# Patient Record
Sex: MALE | Race: WHITE | NOT HISPANIC OR LATINO | Employment: UNEMPLOYED | URBAN - METROPOLITAN AREA
[De-identification: names, ages, dates, MRNs, and addresses within clinical notes are randomized per-mention and may not be internally consistent; named-entity substitution may affect disease eponyms.]

---

## 2018-09-13 ENCOUNTER — HOSPITAL ENCOUNTER (EMERGENCY)
Facility: CLINIC | Age: 33
Discharge: HOME | End: 2018-09-14
Attending: EMERGENCY MEDICINE | Admitting: EMERGENCY MEDICINE

## 2018-09-13 DIAGNOSIS — M62.82 NON-TRAUMATIC RHABDOMYOLYSIS: ICD-10-CM

## 2018-09-13 DIAGNOSIS — F15.10 METHAMPHETAMINE ABUSE (H): ICD-10-CM

## 2018-09-13 DIAGNOSIS — F29 PSYCHOSIS, UNSPECIFIED PSYCHOSIS TYPE (H): ICD-10-CM

## 2018-09-13 PROCEDURE — 25000125 ZZHC RX 250

## 2018-09-13 PROCEDURE — 96360 HYDRATION IV INFUSION INIT: CPT

## 2018-09-13 PROCEDURE — 25000128 H RX IP 250 OP 636

## 2018-09-13 PROCEDURE — 96361 HYDRATE IV INFUSION ADD-ON: CPT

## 2018-09-13 PROCEDURE — 99285 EMERGENCY DEPT VISIT HI MDM: CPT | Mod: 25

## 2018-09-13 PROCEDURE — 25000132 ZZH RX MED GY IP 250 OP 250 PS 637: Performed by: EMERGENCY MEDICINE

## 2018-09-13 RX ORDER — OLANZAPINE 10 MG/1
10 TABLET, ORALLY DISINTEGRATING ORAL ONCE
Status: COMPLETED | OUTPATIENT
Start: 2018-09-13 | End: 2018-09-13

## 2018-09-13 RX ORDER — OLANZAPINE 10 MG/2ML
INJECTION, POWDER, FOR SOLUTION INTRAMUSCULAR
Status: COMPLETED
Start: 2018-09-13 | End: 2018-09-13

## 2018-09-13 RX ORDER — WATER 10 ML/10ML
INJECTION INTRAMUSCULAR; INTRAVENOUS; SUBCUTANEOUS
Status: COMPLETED
Start: 2018-09-13 | End: 2018-09-13

## 2018-09-13 RX ADMIN — OLANZAPINE 10 MG: 10 TABLET, ORALLY DISINTEGRATING ORAL at 15:04

## 2018-09-13 RX ADMIN — WATER: 1 INJECTION INTRAMUSCULAR; INTRAVENOUS; SUBCUTANEOUS at 16:28

## 2018-09-13 RX ADMIN — OLANZAPINE: 10 INJECTION, POWDER, LYOPHILIZED, FOR SOLUTION INTRAMUSCULAR at 16:28

## 2018-09-13 NOTE — ED NOTES
PT will need to be moved to [facility] , he is moving around in the room a lot and has been looking into cabinets and touching the computer desk.

## 2018-09-13 NOTE — ED AVS SNAPSHOT
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      Follow-up Information     Schedule an appointment as soon as possible for a visit with No Ref-Primary, Physician.        Discharge Instructions       Stop using meth, you must push fluids over the next 48 hours to preserve your kidney function.  Recheck in the clinic early next week with your primary care doctor.  Recommend you talk to him about getting help with your meth addiction.        Discharge References/Attachments     GETTING HELP, ADDICTION (ENGLISH)    METHAMPHETAMINE ABUSE AND ADDICTION, UNDERSTANDING (ENGLISH)    RHABDOMYOLYSIS (ENGLISH)      24 Hour Appointment Hotline       To make an appointment at any [facility], call [phone]). If you don't have a family doctor or clinic, we will help you find one. [facility] are conveniently located to serve the needs of you and your family.             Review of your medicines      Notice     You have not been prescribed any medications.            Procedures and tests performed during your visit     Procedure/Test Number of Times Performed    Alcohol breath test POCT 1    Basic metabolic panel 1    CBC with platelets + differential 1    CK total 2    Comprehensive metabolic panel 1    Drug abuse screen 77 urine (FL, RH, SH) 1    TSH with free T4 reflex 1    UA with Microscopic 1      Orders Needing Specimen Collection     None      Pending  Results     No orders found for last 3 day(s).            Pending Culture Results     No orders found for last 3 day(s).            Pending Results Instructions     If you had any lab results that were not finalized at the time of your Discharge, you can call the ED Lab Result RN at [phone]. You will be contacted by this team for any positive Lab results or changes in treatment. The nurses are available 7 days a week from 10A to 6:30P.  You can leave a message 24 hours per day and they will return your call.        Test Results From Your Hospital Stay        9/14/2018  4:41 AM      Component Results     Component Value Ref Range & Units Status    Amphetamine Qual Urine Positive (A) NEG^Negative Final    Cutoff for a positive amphetamine is greater than 500 ng/mL. This is an   unconfirmed screening result to be used for medical purposes only.      Barbiturates Qual Urine Negative NEG^Negative Final    Cutoff for a negative barbiturate is 200 ng/mL or less.    Benzodiazepine Qual Urine Negative NEG^Negative Final    Cutoff for a negative benzodiazepine is 200 ng/mL or less.    Cannabinoids Qual Urine Positive (A) NEG^Negative Final    Cutoff for a positive cannabinoid is greater than 50 ng/mL. This is an   unconfirmed screening result to be used for medical purposes only.      Cocaine Qual Urine Negative NEG^Negative Final    Cutoff for a negative cocaine is 300 ng/mL or less.    Opiates Qualitative Urine Negative NEG^Negative Final    Cutoff for a negative opiate is 300 ng/mL or less.    PCP Qual Urine Negative NEG^Negative Final    Cutoff for a negative PCP is 25 ng/mL or less.         9/14/2018  4:12 AM      Component Results     Component Value Ref Range & Units Status    Alcohol Breath Test 0.00 0.00 - 0.01 Final         9/14/2018  4:39 AM      Component Results     Component Value Ref Range & Units Status    Sodium 142 133 - 144 mmol/L Final    Potassium 4.1 3.4 - 5.3 mmol/L Final    Chloride 110 (H) 94 -  109 mmol/L Final    Carbon Dioxide 26 20 - 32 mmol/L Final    Anion Gap 6 3 - 14 mmol/L Final    Glucose 86 70 - 99 mg/dL Final    Urea Nitrogen 26 7 - 30 mg/dL Final    Creatinine 1.04 0.66 - 1.25 mg/dL Final    GFR Estimate 81 >60 mL/min/1.7m2 Final    Non  GFR Calc    GFR Estimate If Black >90 >60 mL/min/1.7m2 Final    African American GFR Calc    Calcium 8.3 (L) 8.5 - 10.1 mg/dL Final    Bilirubin Total 1.8 (H) 0.2 - 1.3 mg/dL Final    Albumin 3.6 3.4 - 5.0 g/dL Final    Protein Total 6.6 (L) 6.8 - 8.8 g/dL Final    Alkaline Phosphatase 67 40 - 150 U/L Final    ALT 48 0 - 70 U/L Final    AST 81 (H) 0 - 45 U/L Final         9/14/2018  4:48 AM      Component Results     Component Value Ref Range & Units Status    TSH 1.88 0.40 - 4.00 mU/L Final         9/14/2018  4:21 AM      Component Results     Component Value Ref Range & Units Status    WBC 9.8 4.0 - 11.0 10e9/L Final    RBC Count 4.99 4.4 - 5.9 10e12/L Final    Hemoglobin 15.2 13.3 - 17.7 g/dL Final    Hematocrit 44.3 40.0 - 53.0 % Final    MCV 89 78 - 100 fl Final    MCH 30.5 26.5 - 33.0 pg Final    MCHC 34.3 31.5 - 36.5 g/dL Final    RDW 12.2 10.0 - 15.0 % Final    Platelet Count 146 (L) 150 - 450 10e9/L Final    Diff Method Automated Method  Final    % Neutrophils 58.3 % Final    % Lymphocytes 24.0 % Final    % Monocytes 9.9 % Final    % Eosinophils 7.1 % Final    % Basophils 0.4 % Final    % Immature Granulocytes 0.3 % Final    Nucleated RBCs 0 0 /100 Final    Absolute Neutrophil 5.7 1.6 - 8.3 10e9/L Final    Absolute Lymphocytes 2.4 0.8 - 5.3 10e9/L Final    Absolute Monocytes 1.0 0.0 - 1.3 10e9/L Final    Absolute Eosinophils 0.7 0.0 - 0.7 10e9/L Final    Absolute Basophils 0.0 0.0 - 0.2 10e9/L Final    Abs Immature Granulocytes 0.0 0 - 0.4 10e9/L Final    Absolute Nucleated RBC 0.0  Final         9/14/2018  4:51 AM      Component Results     Component Value Ref Range & Units Status    CK Total 1714 (HH) 30 - 300 U/L Final    Critical  Value called to and read back by  BROOKE HAMM IN ED AT 0447 KB           9/14/2018  5:49 AM      Component Results     Component Value Ref Range & Units Status    Color Urine Light Brown  Final    Appearance Urine Cloudy  Final    Glucose Urine Negative NEG^Negative mg/dL Final    Bilirubin Urine Negative NEG^Negative Final    Ketones Urine 10 (A) NEG^Negative mg/dL Final    Specific Gravity Urine 1.029 1.003 - 1.035 Final    Blood Urine Negative NEG^Negative Final    pH Urine 5.5 5.0 - 7.0 pH Final    Protein Albumin Urine 10 (A) NEG^Negative mg/dL Final    Urobilinogen mg/dL Normal 0.0 - 2.0 mg/dL Final    Nitrite Urine Negative NEG^Negative Final    Leukocyte Esterase Urine Negative NEG^Negative Final    Source Midstream Urine  Final    WBC Urine 0 0 - 5 /HPF Final    RBC Urine 0 0 - 2 /HPF Final    Bacteria Urine Many (A) NEG^Negative /HPF Final         9/14/2018 10:43 AM      Component Results     Component Value Ref Range & Units Status    CK Total 1353 (HH) 30 - 300 U/L Final    Critical Value called to and read back by  JESSICA ARIAS IN ED AT 1039 TJ           9/14/2018 10:28 AM      Component Results     Component Value Ref Range & Units Status    Sodium 143 133 - 144 mmol/L Final    Potassium 4.1 3.4 - 5.3 mmol/L Final    Chloride 111 (H) 94 - 109 mmol/L Final    Carbon Dioxide 26 20 - 32 mmol/L Final    Anion Gap 6 3 - 14 mmol/L Final    Glucose 89 70 - 99 mg/dL Final    Urea Nitrogen 19 7 - 30 mg/dL Final    Creatinine 0.93 0.66 - 1.25 mg/dL Final    GFR Estimate >90 >60 mL/min/1.7m2 Final    Non  GFR Calc    GFR Estimate If Black >90 >60 mL/min/1.7m2 Final    African American GFR Calc    Calcium 7.5 (L) 8.5 - 10.1 mg/dL Final                Clinical Quality Measure: Blood Pressure Screening     Your blood pressure was checked while you were in the emergency department today. The last reading we obtained was  BP: 112/53 . Please read the guidelines below about what these numbers mean and  "what you should do about them.  If your systolic blood pressure (the top number) is less than 120 and your diastolic blood pressure (the bottom number) is less than 80, then your blood pressure is normal. There is nothing more that you need to do about it.  If your systolic blood pressure (the top number) is 120-139 or your diastolic blood pressure (the bottom number) is 80-89, your blood pressure may be higher than it should be. You should have your blood pressure rechecked within a year by a primary care provider.  If your systolic blood pressure (the top number) is 140 or greater or your diastolic blood pressure (the bottom number) is 90 or greater, you may have high blood pressure. High blood pressure is treatable, but if left untreated over time it can put you at risk for heart attack, stroke, or kidney failure. You should have your blood pressure rechecked by a primary care provider within the next 4 weeks.  If your provider in the emergency department today gave you specific instructions to follow-up with your doctor or provider even sooner than that, you should follow that instruction and not wait for up to 4 weeks for your follow-up visit.        Thank you for choosing [facility]       Thank you for choosing [facility] for your care. Our goal is always to provide you with excellent care. Hearing back from our patients is one way we can continue to improve our services. Please take a few minutes to complete the written survey that you may receive in the mail after you visit with us. Thank you!        SendUshart Information     [employer] lets you send messages to your doctor, view your test results, renew your prescriptions, schedule appointments and more. To sign up, go to [email or web address] . Click on \"Log in\" on the left side of the screen, which will take you to the Welcome page. Then click on \"Sign up Now\" on the right side of the page.     You will be asked to enter the access code listed below, as well as " some personal information. Please follow the directions to create your username and password.     Your access code is: [email or web address]  Expires: 2018  4:14 PM     Your access code will  in 90 days. If you need help or a new code, please call your [facility] clinic or [phone].        Care EveryWhere ID     This is your Care EveryWhere ID. This could be used by other organizations to access your [facility] medical records  [id number]        Equal Access to Services     [facility], [facility] CampusVIV[facility]EN : Hadrick floreso Socrissy, waaxda luqadaha, qaybta kaalmada adeyassine, gamal varma . So [facility] [phone].    ATENCIÓN: Si habla español, tiene a mayorga disposición servicios gratuitos de asistencia lingüística. Llame al [phone].    We comply with applicable federal civil rights laws and [place] laws. We do not discriminate on the basis of race, color, national origin, age, disability, sex, sexual orientation, or gender identity.            After Visit Summary       This is your record. Keep this with you and show to your community pharmacist(s) and doctor(s) at your next visit.

## 2018-09-13 NOTE — ED NOTES
Bed: ED17  Expected date:   Expected time:   Means of arrival:   Comments:  jayme - 514 - 33M on hold eta 5018

## 2018-09-13 NOTE — Clinical Note
Admitting Physician: HUNTER FERGUSON [[id number]]   Special needs: Sitter [5]   Bed request comments: Inpt- Med bed

## 2018-09-13 NOTE — ED AVS SNAPSHOT
Emergency Department    [address] [place] [address]    Phone:  [phone]    Fax:  [phone]                                       Delroy Ramirez   MRN: [id number]    Department:   Emergency Department   Date of Visit:  9/13/2018           After Visit Summary Signature Page     I have received my discharge instructions, and my questions have been answered. I have discussed any challenges I see with this plan with the nurse or doctor.    ..........................................................................................................................................  Patient/Patient Representative Signature      ..........................................................................................................................................  Patient Representative Print Name and Relationship to Patient    ..................................................               ................................................  Date                                   Time    ..........................................................................................................................................  Reviewed by Signature/Title    ...................................................              ..............................................  Date                                               Time          22EPIC Rev 08/18

## 2018-09-13 NOTE — ED NOTES
Pt was given oral Zyprexa around 1500.  Pt continued to punch and move his bed, bed removed mattress placed in room. Pt continued to put mattress up against the wall and punch it. Pt is not showing any signs of a calmed state , Im Zyprexa given with the help of more staff for safety. IM given in the patients right deltoid.

## 2018-09-13 NOTE — ED PROVIDER NOTES
History     Chief Complaint:  Psychiatric Evaluation    HPI   Delroy Ramirez is a 33 year old male who presents for psychiatric evaluation. The patient was brought in by EMS for evaluation after they were called by bystander who saw the patient yelling and shouting at a [facility]. Per EMS report, the patient was restrained physically in the ambulance, but is not currently in restraints on arrival. The patient is making sexually inappropriate comments, laughing erratically, and will not cooperate with staff. Per police report, the patient has potentially used methamphetamine.    Allergies:  No known drug allergies    Medications:    Risperidone    Past Medical History:    Antisocial personality disorder  Methamphetamine use disorder    Past Surgical History:    Right arm fracture procedure    Family History:    History reviewed. No pertinent family history.     Social History:  Smoking status: Yes  Alcohol use: Yes  Marital Status: Single [1]     Review of Systems   Unable to perform ROS: Acuity of condition     Physical Exam   Patient Vitals for the past 24 hrs:   BP Temp Temp src Pulse Heart Rate Resp SpO2   09/13/18 1407 (!) 161/95 98.5  F (36.9  C) Oral 104 104 24 98 %     Physical Exam  Physical Exam   General:  Sitting on bed.   Making repetitive hip thrusting movements into the air while I am interviewing the patient.  HENT:  No obvious trauma to head  Right Ear:  External ear normal.   Left Ear:  External ear normal.   Nose:  Nose normal.   Eyes:  Conjunctivae and EOM are normal. Pupils are equal, round, and reactive.   Neck: Normal range of motion. Neck supple. No tracheal deviation present.   CV:  Normal heart sounds. No murmur heard.  Pulm/Chest: Effort normal and breath sounds normal.   Abd: Soft. No distension. There is no tenderness. There is no rigidity, no rebound and no guarding.   M/S: Normal range of motion.   Neuro: Alert. GCS 15.  Skin: Skin is warm and dry. No rash noted. Not  diaphoretic.   Psych: Pressured speech, scattered thought process, erratic behavior.    Emergency Department Course   Interventions:  1504: 10 mg Zyprexa ODT  1628: 10 mg Zyprexa IM    Emergency Department Course:  The patient arrived in the emergency department via EMS.  Past medical records, nursing notes, and vitals reviewed.  1446: I performed an exam of the patient and obtained history, as documented above.    Patient is provided 10 mg of Zyprexa ODT.  After about 45 minutes the patient continued to pace his room and cause damage to the bed by trying to remove parts.  The patient was not attempting to harm himself.  He was not redirectable.  The patient was provided 10 mg of Zyprexa IM.    At 2200 the patient will be signed out to my colleague Dr. Rowe pending his sobriety for re-evaluation.    Impression & Plan    Medical Decision Making:  Delroy Ramirez is a very pleasant 33 year old male who presents for psychiatric evaluation. He has likely been using methamphetamine and signs and symptoms are consistent with drug induced psychosis. A broad differential diagnosis was considered including acute infection, metabolic derangement, intracerebral issue (stroke, bleed, tumor, encephalitis, meningitis), medication side effect, psychiatric illness, etc. The patient is not hypoxic and has no history of trauma or focal neurologic findings. Additionally, the patient does not have any systemic symptoms to suggest an infectious etiology.  The patient has a history of this in the past as seen through care everywhere.  Given course in Emergency Department and exam, I doubt at this time there are serious underlying etiologies for the patients symptoms. Drug-induced psychosis is the most likely etiology. The patient will be signed out to the oncoming physician, Dr. Rowe for further evaluation once he is sober. DEC has not been able to evaluate the patient at this time.    Diagnosis:    ICD-10-CM   1. Psychosis,  unspecified psychosis type F29     Disposition: Signed out to Dr. Rowe pending sobriety    Kellee Wilkins  9/13/2018    EMERGENCY DEPARTMENT    I, Klelee Wilkins, am serving as a scribe at 2:46 PM on 9/13/2018 to document services personally performed by Ruel Chua DO based on my observations and the provider's statements to me.           Ruel Chua DO  09/13/18 1932

## 2018-09-14 VITALS
TEMPERATURE: 98.6 F | SYSTOLIC BLOOD PRESSURE: 112 MMHG | HEART RATE: 104 BPM | RESPIRATION RATE: 18 BRPM | OXYGEN SATURATION: 97 % | DIASTOLIC BLOOD PRESSURE: 53 MMHG

## 2018-09-14 LAB
ALBUMIN SERPL-MCNC: 3.6 G/DL (ref 3.4–5)
ALBUMIN UR-MCNC: 10 MG/DL
ALCOHOL BREATH TEST: 0 (ref 0–0.01)
ALP SERPL-CCNC: 67 U/L (ref 40–150)
ALT SERPL W P-5'-P-CCNC: 48 U/L (ref 0–70)
AMPHETAMINES UR QL SCN: POSITIVE
ANION GAP SERPL CALCULATED.3IONS-SCNC: 6 MMOL/L (ref 3–14)
ANION GAP SERPL CALCULATED.3IONS-SCNC: 6 MMOL/L (ref 3–14)
APPEARANCE UR: ABNORMAL
AST SERPL W P-5'-P-CCNC: 81 U/L (ref 0–45)
BACTERIA #/AREA URNS HPF: ABNORMAL /HPF
BARBITURATES UR QL: NEGATIVE
BASOPHILS # BLD AUTO: 0 10E9/L (ref 0–0.2)
BASOPHILS NFR BLD AUTO: 0.4 %
BENZODIAZ UR QL: NEGATIVE
BILIRUB SERPL-MCNC: 1.8 MG/DL (ref 0.2–1.3)
BILIRUB UR QL STRIP: NEGATIVE
BUN SERPL-MCNC: 19 MG/DL (ref 7–30)
BUN SERPL-MCNC: 26 MG/DL (ref 7–30)
CALCIUM SERPL-MCNC: 7.5 MG/DL (ref 8.5–10.1)
CALCIUM SERPL-MCNC: 8.3 MG/DL (ref 8.5–10.1)
CANNABINOIDS UR QL SCN: POSITIVE
CHLORIDE SERPL-SCNC: 110 MMOL/L (ref 94–109)
CHLORIDE SERPL-SCNC: 111 MMOL/L (ref 94–109)
CK SERPL-CCNC: 1353 U/L (ref 30–300)
CK SERPL-CCNC: 1714 U/L (ref 30–300)
CO2 SERPL-SCNC: 26 MMOL/L (ref 20–32)
CO2 SERPL-SCNC: 26 MMOL/L (ref 20–32)
COCAINE UR QL: NEGATIVE
COLOR UR AUTO: ABNORMAL
CREAT SERPL-MCNC: 0.93 MG/DL (ref 0.66–1.25)
CREAT SERPL-MCNC: 1.04 MG/DL (ref 0.66–1.25)
DIFFERENTIAL METHOD BLD: ABNORMAL
EOSINOPHIL # BLD AUTO: 0.7 10E9/L (ref 0–0.7)
EOSINOPHIL NFR BLD AUTO: 7.1 %
ERYTHROCYTE [DISTWIDTH] IN BLOOD BY AUTOMATED COUNT: 12.2 % (ref 10–15)
GFR SERPL CREATININE-BSD FRML MDRD: 81 ML/MIN/1.7M2
GFR SERPL CREATININE-BSD FRML MDRD: >90 ML/MIN/1.7M2
GLUCOSE SERPL-MCNC: 86 MG/DL (ref 70–99)
GLUCOSE SERPL-MCNC: 89 MG/DL (ref 70–99)
GLUCOSE UR STRIP-MCNC: NEGATIVE MG/DL
HCT VFR BLD AUTO: 44.3 % (ref 40–53)
HGB BLD-MCNC: 15.2 G/DL (ref 13.3–17.7)
HGB UR QL STRIP: NEGATIVE
IMM GRANULOCYTES # BLD: 0 10E9/L (ref 0–0.4)
IMM GRANULOCYTES NFR BLD: 0.3 %
KETONES UR STRIP-MCNC: 10 MG/DL
LEUKOCYTE ESTERASE UR QL STRIP: NEGATIVE
LYMPHOCYTES # BLD AUTO: 2.4 10E9/L (ref 0.8–5.3)
LYMPHOCYTES NFR BLD AUTO: 24 %
MCH RBC QN AUTO: 30.5 PG (ref 26.5–33)
MCHC RBC AUTO-ENTMCNC: 34.3 G/DL (ref 31.5–36.5)
MCV RBC AUTO: 89 FL (ref 78–100)
MONOCYTES # BLD AUTO: 1 10E9/L (ref 0–1.3)
MONOCYTES NFR BLD AUTO: 9.9 %
NEUTROPHILS # BLD AUTO: 5.7 10E9/L (ref 1.6–8.3)
NEUTROPHILS NFR BLD AUTO: 58.3 %
NITRATE UR QL: NEGATIVE
NRBC # BLD AUTO: 0 10*3/UL
NRBC BLD AUTO-RTO: 0 /100
OPIATES UR QL SCN: NEGATIVE
PCP UR QL SCN: NEGATIVE
PH UR STRIP: 5.5 PH (ref 5–7)
PLATELET # BLD AUTO: 146 10E9/L (ref 150–450)
POTASSIUM SERPL-SCNC: 4.1 MMOL/L (ref 3.4–5.3)
POTASSIUM SERPL-SCNC: 4.1 MMOL/L (ref 3.4–5.3)
PROT SERPL-MCNC: 6.6 G/DL (ref 6.8–8.8)
RBC # BLD AUTO: 4.99 10E12/L (ref 4.4–5.9)
RBC #/AREA URNS AUTO: 0 /HPF (ref 0–2)
SODIUM SERPL-SCNC: 142 MMOL/L (ref 133–144)
SODIUM SERPL-SCNC: 143 MMOL/L (ref 133–144)
SOURCE: ABNORMAL
SP GR UR STRIP: 1.03 (ref 1–1.03)
TSH SERPL DL<=0.005 MIU/L-ACNC: 1.88 MU/L (ref 0.4–4)
UROBILINOGEN UR STRIP-MCNC: NORMAL MG/DL (ref 0–2)
WBC # BLD AUTO: 9.8 10E9/L (ref 4–11)
WBC #/AREA URNS AUTO: 0 /HPF (ref 0–5)

## 2018-09-14 PROCEDURE — 99207 ZZC CDG-CODE CATEGORY CHANGED: CPT | Performed by: INTERNAL MEDICINE

## 2018-09-14 PROCEDURE — 80053 COMPREHEN METABOLIC PANEL: CPT | Performed by: EMERGENCY MEDICINE

## 2018-09-14 PROCEDURE — 81001 URINALYSIS AUTO W/SCOPE: CPT | Performed by: EMERGENCY MEDICINE

## 2018-09-14 PROCEDURE — 25000132 ZZH RX MED GY IP 250 OP 250 PS 637: Performed by: EMERGENCY MEDICINE

## 2018-09-14 PROCEDURE — 84443 ASSAY THYROID STIM HORMONE: CPT | Performed by: EMERGENCY MEDICINE

## 2018-09-14 PROCEDURE — 82550 ASSAY OF CK (CPK): CPT | Performed by: EMERGENCY MEDICINE

## 2018-09-14 PROCEDURE — 80307 DRUG TEST PRSMV CHEM ANLYZR: CPT | Performed by: EMERGENCY MEDICINE

## 2018-09-14 PROCEDURE — 85025 COMPLETE CBC W/AUTO DIFF WBC: CPT | Performed by: EMERGENCY MEDICINE

## 2018-09-14 PROCEDURE — 25000128 H RX IP 250 OP 636: Performed by: EMERGENCY MEDICINE

## 2018-09-14 PROCEDURE — 90791 PSYCH DIAGNOSTIC EVALUATION: CPT

## 2018-09-14 PROCEDURE — 80048 BASIC METABOLIC PNL TOTAL CA: CPT | Performed by: EMERGENCY MEDICINE

## 2018-09-14 PROCEDURE — 99204 OFFICE O/P NEW MOD 45 MIN: CPT | Performed by: INTERNAL MEDICINE

## 2018-09-14 RX ORDER — OLANZAPINE 10 MG/1
10 TABLET, ORALLY DISINTEGRATING ORAL AT BEDTIME
Status: DISCONTINUED | OUTPATIENT
Start: 2018-09-14 | End: 2018-09-14 | Stop reason: HOSPADM

## 2018-09-14 RX ORDER — LORAZEPAM 1 MG/1
1 TABLET ORAL ONCE
Status: COMPLETED | OUTPATIENT
Start: 2018-09-14 | End: 2018-09-14

## 2018-09-14 RX ORDER — SODIUM CHLORIDE 9 MG/ML
INJECTION, SOLUTION INTRAVENOUS ONCE
Status: COMPLETED | OUTPATIENT
Start: 2018-09-14 | End: 2018-09-14

## 2018-09-14 RX ADMIN — SODIUM CHLORIDE 1000 ML: 9 INJECTION, SOLUTION INTRAVENOUS at 05:13

## 2018-09-14 RX ADMIN — SODIUM CHLORIDE 1000 ML: 9 INJECTION, SOLUTION INTRAVENOUS at 06:08

## 2018-09-14 RX ADMIN — SODIUM CHLORIDE: 9 INJECTION, SOLUTION INTRAVENOUS at 08:15

## 2018-09-14 RX ADMIN — OLANZAPINE 10 MG: 10 TABLET, ORALLY DISINTEGRATING ORAL at 02:23

## 2018-09-14 RX ADMIN — LORAZEPAM 1 MG: 1 TABLET ORAL at 08:43

## 2018-09-14 NOTE — ED PROVIDER NOTES
Patient was seen by DEC. patient admits to using methamphetamines after getting out of [facility].    We were able to contact the patient's mother who picked up the phone on the first ring.  She notes that the patient was released from [facility] on Monday and that he had escalating behaviors starting on Wednesday and had thought about bringing him into mental health unit but the patient then went missing for 48 hours.  She was about to file a missing persons report and obviously picked up the phone she was quite concerned about him at 5 in the morning when we called her.    Patient will be admitted for rhabdomyolysis with IV fluids.  He is now graduated to a bed.  He is allowing us to draw blood and provided a urine sample for us which look quite dark and concerning for rhabdo and having him walk around he has a mildly antalgic gait.  Patient should clear with IV fluids but will likely need transferring to a mental health unit afterwards or at least a psych consult.  Patient's behaviors here in the ER have been less than stellar as noted in prior note.  Patient seem to be improving with his behaviors but when I was pressing him about methamphetamine use, he threw water against the wall.  Patient has not shown aggression to myself or staff but multiple inappropriate behaviors have been noted.     Connor Rowe MD  09/14/18 3567

## 2018-09-14 NOTE — ED NOTES
Patient resting on right side, repositions independently.  Continues to be on a  hold with security watch.  Resp 16 and regular

## 2018-09-14 NOTE — ED NOTES
Patient incontinent of stool. Unable to clean himself up, crying in bathroom. Changed into clean scrubs and linen changed.

## 2018-09-14 NOTE — ED NOTES
Patient's mother confirmed that patient's sister will pick patient up and will be leaving shortly.

## 2018-09-14 NOTE — ED NOTES
Writer updated pt mother Pauly Lowry via phone as she called the ED.  Verbal approval obtained from patient.  Mothers phone # is [phone]

## 2018-09-14 NOTE — DISCHARGE INSTRUCTIONS
Stop using meth, you must push fluids over the next 48 hours to preserve your kidney function.  Recheck in the clinic early next week with your primary care doctor.  Recommend you talk to him about getting help with your meth addiction.

## 2018-09-14 NOTE — ED NOTES
Patient continues to have intermittent bouts of fidgiting and humping the bed while lying supine. Dr. Farrar notified. Order for ativan placed.  Patient sitting in the [facility] area to eat breakfast.  Writer noticed Delroy was limping when he walked.  He states he has blisters on his feet.  Skin reassessed.  Pt directable and has been courteous to writer.  IV fluids are on an IV pole while patient is sitting in [facility] area with no issues.  Pt was still hungry after eating breakfest. Given a courtesy meal

## 2018-09-14 NOTE — ED NOTES
DATE:  9/14/2018   TIME OF RECEIPT FROM LAB:  4:51  LAB TEST:  CK  LAB VALUE:  1714  RESULTS GIVEN WITH READ-BACK TO (PROVIDER):  Connor Rowe MD  TIME LAB VALUE REPORTED TO PROVIDER:   4:51

## 2018-09-14 NOTE — ED NOTES
[facility]  ED Nurse Handoff Report    ED Chief complaint: Psychiatric Evaluation (Bystanders called 911 when they found wandering around [facility]. pt acting irratically, laughing then crying, not answering questions)      ED Diagnosis:   Final diagnoses:   Psychosis, unspecified psychosis type   Non-traumatic rhabdomyolysis       Code Status: Full Code    Allergies: No Known Allergies    Activity level - Baseline/Home:  Independent    Activity Level - Current:   Independent     Needed?: No    Isolation: No  Infection: Not Applicable  Bariatric?: No    Vital Signs:   Vitals:    09/13/18 1407 09/13/18 2141 09/14/18 0446   BP: (!) 161/95 134/69 112/53   Pulse: 104     Resp: 24 18    Temp: 98.5  F (36.9  C) 98.6  F (37  C)    TempSrc: Oral Oral    SpO2: 98% 97% 97%       Cardiac Rhythm: ,        Pain level: 0-10 Pain Scale: 0    Is this patient confused?: No   [place] - Suicide Severity Rating Scale Completed?  Yes  If yes, what color did the patient score?  White    Patient Report: Initial Complaint: Psych Eval  Focused Assessment: Pt presents to the ED via EMS and PD after being seen yelling and shouting, showing erratic behavior at hotel. Hx of meth abuse. Pt initially uncooperative with staff, has since become cooperative. Has had aggressive outbursts including yelling and punching/kicking bed. Pt given a total of 30mg of zyprexa while here in the ED. Pt has had diarrhea and complains of pain in feet. IV and fluids started for elevated CK.  Tests Performed:   Labs Ordered and Resulted from Time of ED Arrival Up to the Time of Departure from the ED   DRUG ABUSE SCREEN 77 URINE (FL, RH, SH) - Abnormal; Notable for the following:        Result Value    Amphetamine Qual Urine Positive (*)     Cannabinoids Qual Urine Positive (*)     All other components within normal limits   COMPREHENSIVE METABOLIC PANEL - Abnormal; Notable for the following:     Chloride 110 (*)     Calcium 8.3  (*)     Bilirubin Total 1.8 (*)     Protein Total 6.6 (*)     AST 81 (*)     All other components within normal limits   CBC WITH PLATELETS DIFFERENTIAL - Abnormal; Notable for the following:     Platelet Count 146 (*)     All other components within normal limits   CK TOTAL - Abnormal; Notable for the following:     CK Total 1714 (*)     All other components within normal limits   ALCOHOL BREATH TEST POCT - Normal   TSH WITH FREE T4 REFLEX   ROUTINE UA WITH MICROSCOPIC       Abnormal Results: See above.  Treatments provided: Zyprexa 30mg total, 1L NS bolus.    Family Comments: N/A    OBS brochure/video discussed/provided to patient: N/A    ED Medications:   Medications   OLANZapine zydis (zyPREXA) ODT tab 10 mg (10 mg Oral Given 9/14/18 0223)   OLANZapine zydis (zyPREXA) ODT tab 10 mg (10 mg Oral Given 9/13/18 1504)   OLANZapine (zyPREXA) 10 MG injection (  Given 9/13/18 1628)   sterile water (preservative free) injection (  Given 9/13/18 1628)   0.9% sodium chloride BOLUS (1,000 mLs Intravenous New Bag 9/14/18 3731)       Drips infusing?:  No    For the majority of the shift this patient was Yellow.   Interventions performed were Decreased stimulation, medications.    Severe Sepsis OR Septic Shock Diagnosis Present: No      ED NURSE PHONE NUMBER: *[phone]

## 2018-09-14 NOTE — ED PROVIDER NOTES
She was signed out to me overnight.  He was psychotic from methamphetamine abuse.  This morning his mentation cleared.  He has some mild confusion but for the most part he is on track mentally.  He feels embarrassed about what happened last night with his behavior.  He did develop some early rhabdomyolysis with an elevated CK and dark urine.  However his renal function is normal.  He was given 2 L of fluids and he is on his third liter now.  A second CK has dropped from 0045-6498.  Reading the hospitalist note from consultation this morning, if his CK is decreasing with normal renal function, he will be medically stable to be either admitted to mental health or discharge.  At this point, his psychosis has cleared and was likely secondary to methamphetamine.  He is not suicidal.  I did not feel that he is a danger to himself or others at this point and can be discharged home to his mom.  He needs to follow-up in the clinic early next week and push fluids through the weekend.     Mary Farrar MD  09/14/18 4242

## 2018-09-14 NOTE — ED NOTES
DATE:  9/14/2018   TIME OF RECEIPT FROM LAB:  1043  LAB TEST:  CK  LAB VALUE:  1353  RESULTS GIVEN WITH READ-BACK TO (PROVIDER):  Dr. Farrar  TIME LAB VALUE REPORTED TO PROVIDER:   1042

## 2018-09-14 NOTE — ED NOTES
Patient stated he was tired and wanted to go back to bed.  He is exhibiting appropriate and courteous behaviors towards staff and is cooperative.  Pt ambulated to the bathroom with escort by writer.  Given blanket and pillow.

## 2018-09-14 NOTE — ED NOTES
"Up to the bathroom.  Crying and stating, \"This hurts so bad\".  He appears stiff with ambulation.  I helped him to the bathroom, with security assistance.  Crying and screaming in the bathroom.  Loud banging in bathroom.  When I open the door he stops.  Zyprexa oral given.  Ice water, juice, and a meal given to him.  He ambulated without assistance back to his room and closed his door.  He is covered in water.  I asked if he wanted new scrubs but he shut the door.  "

## 2018-09-14 NOTE — H&P
History and Physical     Delroy Ramirez MRN# [id number]   YOB: 1985 Age: 33 year old      Date of Admission:  9/13/2018    Primary care provider: No Ref-Primary, Physician          Assessment and Plan:   This is a 33 year old male presents to the ED with psychosis and rhabdomyolysis.    1.  Rhabdomyolysis.  CK for this morning was elevated at 1714.  Recommend that the patient be fluid resuscitated with 2 L normal saline followed by a repeat CK and BMP at 10 AM.  If CK is trending down, patient would be medically appropriate for admission to adult mental health unit.  Creatine kinase levels below 5000 are rarely associated with kidney injury.    2.  Psychosis.  Uncertain etiology, whether this is precipitated by drug use or due to underlying psychiatric illness.  If patient requires admission to the medical floor, recommend urgent psychiatry consult for assistance with behavioral issues.     # Pain Assessment:  Current Pain Score 9/13/2018   Patient currently in pain? denies   Pain score (0-10) 0   - Delroy is unable to participate in a collaborative plan for pain management due to psychosis.   - Please see the plan for pain management as documented above        Attestation:  This patient has been seen and evaluated by me, Jose Perez MD.           Chief Complaint:   This patient is a 33 year old male who presents with erratic behavior.    History is obtained from the patient         History of Present Illness:   Patient was brought into the [facility] emergency department yesterday around 2 PM.  He was seen by a bystander yelling and acting erratically near the [facility] by the [facility].  The patient was initially restrained in the ambulance but arrived in the emergency department off restraints.  He was noted to be laughing erratically and uncooperative with staff.  He was making sexually inappropriate comments.  Patient's bed was removed from his room due to  aggressive behavior in the emergency department and mattress was placed on the floor.  He was given both Zyprexa ODT and IM Zyprexa with patient subsequently sleeping.  Patient had one episode of stool incontinence and was witnessed rubbing stool on himself.  He was also reported masturbating while using a urinal.  Nurses note patient has an antalgic gait.  He complains of pain in his feet and grimaces when moving his legs.  Patient is uncooperative and evasive during my interview.  He denies drug use, but urine tox screen is positive for both methamphetamine and cannabinoids.  Per the patient's mother, patient was released from [facility] on Monday.  She noticed escalating behaviors and considered taking the patient to mental health unit.  Unfortunately, the patient disappeared after Wednesday and she had considered filing a missing persons report.             Past Medical History:   No past medical history on file.          Past Surgical History:   No past surgical history on file.          Social History:     Social History   Substance Use Topics     Smoking status: Not on file     Smokeless tobacco: Not on file     Alcohol use Not on file             Family History:   No family history on file.          Immunizations:     There is no immunization history on file for this patient.         Allergies:   No Known Allergies          Medications:     Prior to Admission medications    Not on File            Review of Systems:   The 10 point Review of Systems is negative other than noted in the HPI           Physical Exam:   Vitals were reviewed  Temp: 98.6  F (37  C) Temp src: Oral BP: 112/53 Pulse: 104 Heart Rate: 64 Resp: 18 SpO2: 97 % O2 Device: None (Room air)      This is a well nourished well developed male resting comfortably in bed, laughing during interview  Head: atraumatic normocephalic, sclera noninjected and anicterric, oral mucosa moist without lesions or exudates.  Neck: supple without spinal  abnormality  Chest: clear to auscultation bilaterally, without wheezes, rhonchi, or rales.  Cardiovascular: regular rate and rhythm, no murmurs, gallops, or rubs, no edema  Abdomen: bowel sounds normal, nontender and nondistended, no hepatosplenomegaly or masses.  Musculoskeletal: normal muscle mass and tone  Skin: no rashes  Lymph: no lymphadenopathy.  Neuro: cranial nerves II-XII intact, strength in all four extremities normal, reflexes normal, coordination normal.         Data:   All laboratory data reviewed  All cardiac studies reviewed by me.  All imaging studies reviewed by me.

## 2018-09-14 NOTE — ED NOTES
Writer was at pt bedside and introduced self.  IV fluids stopped.  Pt given a menu to order hot breakfast.  When writer was back at the work station pt was visualized intermittently patting himself on the buttocks.  Appears fidgety.

## 2018-09-14 NOTE — ED PROVIDER NOTES
"Patient signed out to myself as the night MD.  I spoke with the patient at 3:40 am.  He is awake and alert.  He states he has a history of ADD, anxiety and anger issues.  He was at the [facility] today and upset about his life in general and got upset.  When asked about using Methamphetamines he states he did not use, but admits to using in the past.  When asked if last used was 1 day, 1 week, 1 month, or 1 year, he states he can't remember and can't give me a clear answer.  He states he lives with his mother.  He denies being suicidal.  Denies drinking alcohol.  He does not appear to be hallucinating.     He did have an episode of incontinence of stool, and when asked he states, \"yes I shit myself, so what.\"  He denies doing this at home.    He also had episodes of kicking, punching, and tearing at his bed and bedrails, so his bed was taken away, currently he is laying on a mattress on the floor.  He threw his pillow out the door.  He spilt his water on the floor.  When asking to get a urine sample and using a urinal, he was noted to be masturbating.  When he was incontinent  of stool, he was rubbing the stool on himself.  He was at times banging on the walls.  Per records from care everywhere, he has had similar presentations and was due to methamphetamines.           Connor Rowe MD  09/14/18 0400    "

## 2018-09-14 NOTE — ED NOTES
Spoke with mother regarding patient's discharge.  Mother reports she will find a ride for patient and call us back.

## 2021-06-16 PROBLEM — F29 PSYCHOSIS (H): Status: ACTIVE | Noted: 2018-09-17

## 2022-09-09 ENCOUNTER — OFFICE VISIT (OUTPATIENT)
Dept: BEHAVIORAL HEALTH | Facility: CLINIC | Age: 37
End: 2022-09-09
Payer: COMMERCIAL

## 2022-09-09 VITALS
DIASTOLIC BLOOD PRESSURE: 92 MMHG | HEIGHT: 73 IN | WEIGHT: 223.1 LBS | SYSTOLIC BLOOD PRESSURE: 152 MMHG | HEART RATE: 96 BPM | BODY MASS INDEX: 29.57 KG/M2

## 2022-09-09 DIAGNOSIS — F15.20 METHAMPHETAMINE USE DISORDER, SEVERE (H): ICD-10-CM

## 2022-09-09 DIAGNOSIS — F43.10 PTSD (POST-TRAUMATIC STRESS DISORDER): ICD-10-CM

## 2022-09-09 DIAGNOSIS — F11.93 OPIOID WITHDRAWAL (H): ICD-10-CM

## 2022-09-09 DIAGNOSIS — F11.90 OPIOID USE DISORDER: Primary | ICD-10-CM

## 2022-09-09 LAB — FENTANYL UR QL: NORMAL

## 2022-09-09 PROCEDURE — 99204 OFFICE O/P NEW MOD 45 MIN: CPT | Performed by: FAMILY MEDICINE

## 2022-09-09 PROCEDURE — 80307 DRUG TEST PRSMV CHEM ANLYZR: CPT | Performed by: FAMILY MEDICINE

## 2022-09-09 RX ORDER — MIRTAZAPINE 15 MG/1
TABLET, FILM COATED ORAL
COMMUNITY
Start: 2022-01-10

## 2022-09-09 RX ORDER — RISPERIDONE 2 MG/1
TABLET ORAL
COMMUNITY
Start: 2021-09-23

## 2022-09-09 RX ORDER — ONDANSETRON 4 MG/1
4 TABLET, ORALLY DISINTEGRATING ORAL EVERY 8 HOURS PRN
Qty: 12 TABLET | Refills: 0 | Status: SHIPPED | OUTPATIENT
Start: 2022-09-09

## 2022-09-09 RX ORDER — BUPRENORPHINE HYDROCHLORIDE AND NALOXONE HYDROCHLORIDE DIHYDRATE 8; 2 MG/1; MG/1
1 TABLET SUBLINGUAL 2 TIMES DAILY
Qty: 14 TABLET | Refills: 0 | Status: SHIPPED | OUTPATIENT
Start: 2022-09-09

## 2022-09-09 RX ORDER — CLONIDINE HYDROCHLORIDE 0.1 MG/1
0.1 TABLET ORAL 3 TIMES DAILY PRN
Qty: 15 TABLET | Refills: 0 | Status: SHIPPED | OUTPATIENT
Start: 2022-09-09

## 2022-09-09 ASSESSMENT — PATIENT HEALTH QUESTIONNAIRE - PHQ9: SUM OF ALL RESPONSES TO PHQ QUESTIONS 1-9: 15

## 2022-09-09 NOTE — PATIENT INSTRUCTIONS
"When it has been AT LEAST 24 hours from your last use of other opioids:    1st: place 1/4 of one 8mg/2mg film under your tongue, then wait 30 minutes.    If withdrawal symptoms are not worse, take the remaining 3/4 of film (6mg.)    If withdrawal symptoms do increase after this \"test dose,\" don't take any more buprenorphine at that time, and use other medications for withdrawal symptoms.  Try start buprenorphine again the next day.     After the first 8mg dose on day one, if you develop more withdrawal symptoms or cravings in 1-2 hours, you can take another 1/2 film (4mg.)  Again, 2 hours later, you can take another 1/2 film (4mg) to treat symptoms.     Starting day 2, take one 8mg/2mg film every morning and another 8mg/2mg film every evening.  Continue this until your next appointment.     Schedule a follow up appointment in the Recovery Clinic within one week.   Let medical staff know of any problems in the meantime.     [address] [address]    Phone: [phone]    Hours: Monday through Friday 9a-4p; walk in 9a-3p      "

## 2022-09-09 NOTE — PROGRESS NOTES
[facility] - Recovery Clinic Initial Visit    ASSESSMENT/PLAN                                                      1. Opioid use disorder  Reviewed history and he does meet criteria for OUD.  UDS is negative in clinic which could be do to timing of last use >48 hours ago or unknowingly using fentanyl (I do see +UDS for fentanyl 9/2021).  We reviewed MAT options and he would like to start Suboxone.  We reviewed home induction instructions including use of test dose, timing of first dose, and risk of precipitated withdrawal.  He was able to repeat home induction instructions back to me and was provided written instructions in AVS.  He is currently at [facility] (brought to clinic by staff).    - Fentanyl Urine, Qualitative; Standing  - buprenorphine-naloxone (SUBOXONE) 8-2 MG SUBL sublingual tablet; Place 1 tablet under the tongue 2 times daily After following home induction instructions.  Dispense: 14 tablet; Refill: 0  - naloxone (NARCAN) 4 MG/0.1ML nasal spray; Spray 1 spray (4 mg) into one nostril alternating nostrils as needed for opioid reversal every 2-3 minutes until assistance arrives  Dispense: 0.2 mL; Refill: 11  - Fentanyl Urine, Qualitative  - Drug Confirmation Panel Urine with Creat - lab collect; Future  - Drug Confirmation Panel Urine with Creat - lab collect    2. Opioid withdrawal (H)  Reviewed use of comfort medications.  - cloNIDine (CATAPRES) 0.1 MG tablet; Take 1 tablet (0.1 mg) by mouth 3 times daily as needed (opioid withdrawal)  Dispense: 15 tablet; Refill: 0  - ondansetron (ZOFRAN ODT) 4 MG ODT tab; Take 1 tablet (4 mg) by mouth every 8 hours as needed for nausea  Dispense: 12 tablet; Refill: 0    3. Methamphetamine use disorder, severe (H)  Reports last use on 9/6. Monitor cravings.      4. PTSD (post-traumatic stress disorder)  Requests referral for psychiatry.  Multiple ER visits and admissions for mental health and substance use.  Discussed that it will be best for  diagnosis to be established when sober.    - Adult Mental Health  Referral; Future      Return in about 1 week (around 9/16/2022) for Follow up, with me, in person.      SUBJECTIVE                                                      CC/HPI:  Delroy Ramirez is a 37 year old male with PMHx of PTSD/anxiety, ADD, and opioid use disorder who presents to the Recovery Clinic for initial visit.  Pt was referred by Taryn (arrived 2 days ago).   Pt decided to seek treatment in Recovery Clinic to start Suboxone.     He does not have a primary care doctor.  Would like to establish care with someone soon.    He was released from detention 9/6 (after 4 months for parole violation) and went to [facility].  Was using heroin while in detention 1-3 times per day.  Feels his withdrawal has peaked and is a little better now, wants to start Suboxone.  Has never taken Suboxone and would like start.      Substance Use History :  Opioids:   Age at first use: 21yrs old.  Tried when he was 21 - using heroin and opium. Started using heroin regularly over the past 2 years.    Current use:  timing of last use: 9/6/2022; substance: heroin; quantity 1/4g; route: inhalation, intranasal     IV drug use: Yes: 3 times (methamphetamine, last time was in 2014)  History of overdose: No   Previous treatments : Yes: 5 times (Currently at [facility]; previous [facility] [place], [address], [place], and [facility])  Longest period of sobriety: 3 mo  Medical complications related to substance use: denies  Hepatitis C: neg per pt reports (states done in detention mid 2022)  HIV: neg per pt reports (states done in detention mid 2022)    Taking buprenorphine? No  Narcan currently available: No    DSM-5 OUD criteria met:  Taken in larger amounts/greater time spent in behavior over longer period of time than intended,Yes:   Persistent desire or unsuccessful efforts to cut down or control use/behavior, Yes   A great deal of time is spent in activities necessary to obtain the  "substance/participate in the behavior or recover from its effects, No  Cravings, Yes:   Recurrent use/behavior resulting in failure to fulfill major role obligations at work, school, or home, Yes   Continued use/behavior despite having persistent or recurrent social or interpersonal problems caused or exacerbated by effects of use/behavior, Yes   Important social, occupational, or recreational activities are given up or reduced because of use/behavior, Yes:   Recurrent use/behavior in situations in which it is physically hazardous, Yes:   Continued use/behavior despite knowledge of having a persistent or recurrent physical or psychological problem that is likely to have been caused or exacerbated by use/behavior, Yes:   Tolerance, Yes:    Withdrawal, Yes: tired, \"crawl out of my skin\", nausea/vomiting/diarrhea, sweaty, shaky, anxious     Other Addiction History:  Stimulants: Meth  Past use: Started using at age [age], increased use at age 21, identifies this as drug of choice  Use in last 6 months: 9/6/2022  Sedatives/hypnotics/anxiolytics:   Past use: denies  Use in last 6 months: NA  Alcohol:   Past use: yes  Use in last 6 months: denies  Nicotine:   Cigarettes: yes - not ready to quit  Vaping: no  Chewing tobacco: no  Cannabis:   Past use: yes  Use in last 6 months: no  Hallucinogens:   Past use: yes  Use in last 6 months: 5/7/2022 - used acid  Behavioral addictions:   Stealing (treatment programs have been helpful in past)         [place] Prescription Drug Monitoring Program Reviewed:  Yes; as expected    No past medical history on file.      PAST PSYCHIATRIC HISTORY:  Diagnoses- ADD, PTSD, ANXIETY  Suicide Attempts: No   Hospitalizations: Yes 3     PHQ Score:     PHQ Assesment Total Score(s) 9/9/2022   PHQ-9 Score 15   Some recent data might be hidden         If PHQ-9 score of 15 or higher, has Recovery Clinic therapist or provider been notified? Yes    Any current suicidal ideation? No  If yes, has Recovery " Clinic therapist or provider been notified? Yes    Mental health provider: needs referral (follow up on MH referral if needed)    No past surgical history on file.    Medications:  risperiDONE (RISPERDAL) 2 MG tablet, Take 1 tablet (2mg) by mouth nightly at bedtime scheduled and 1 tablet (2mg) by mouth once daily as needed for anxiety.  mirtazapine (REMERON) 15 MG tablet,     No current facility-administered medications on file prior to visit.      No Known Allergies    No family history on file.      Social History  Housing status: in a sober house, residental house #[facility] @ [facility]  Employment status: Employed part time (self employed, cleans houses and mowing)  Relationship status: Single  Children: no children  Legal: parole  Insurance needs: active  Contact information up to date? yes    3rd Party Involvement not at this time (please obtain FERCHO if pt would like to include)    REVIEW OF SYSTEMS:  General: Withdrawal symptoms as described below.  No recent fevers.   Eyes:  No vision concerns.  No jaundice.    Resp: No coughing, wheezing or shortness of breath  CV: No chest pains or palpitations  GI: No complaints (diarrhea and nausea passed)  : No urinary frequency or dysuria, no discharge  Musculoskeletal: No significant muscle or joint pains other than as above.  No edema  Neurologic: No numbness, tingling, weakness, problems with balance or coordination  Psychiatric: No acute concerns other than as above.   Skin: No rashes or areas of acute infection    OBJECTIVE                                                        Clinical Opioid Withdrawal Scale (COWS)    Resting Pulse Rate  1  =     Sweating    (over past 1/2 hour) 2  =  flushed or observable moistness on face   Restlessness  0  =  able to sit still   Pupil size  0  =  pupils pinned or normal size for room light   Bone or Joint Aches    (acute only) 0  =  not present   Runny nose or tearing    (unrelated to cold/allergies) 0  =  not present   GI Upset  "   (over past 1/2 hour) 0  =  no GI symptoms   Tremor    (outstretched hands) 0  =  no tremor   Yawning    (during assessment) 0  =  no yawning   Anxiety/Irritability 0  =  none   Gooseflesh skin 0  =  skin is smooth     TOTAL SCORE  Add column for score   3       BP (!) 152/92   Pulse 96   Ht 1.854 m (6' 1\")   Wt 101.2 kg (223 lb 1.6 oz)   BMI 29.43 kg/m      Physical Exam  Vitals reviewed.   Constitutional:       General: He is not in acute distress.     Appearance: Normal appearance. He is diaphoretic. He is not ill-appearing.   HENT:      Head: Normocephalic and atraumatic.   Eyes:      General: No scleral icterus.     Conjunctiva/sclera: Conjunctivae normal.   Cardiovascular:      Rate and Rhythm: Normal rate.   Pulmonary:      Effort: Pulmonary effort is normal. No respiratory distress.   Skin:     General: Skin is warm.      Coloration: Skin is not jaundiced or pale.   Neurological:      General: No focal deficit present.      Mental Status: He is alert and oriented to person, place, and time.      Motor: No tremor.      Gait: Gait normal.   Psychiatric:         Attention and Perception: Attention normal.         Mood and Affect: Mood is depressed. Affect is flat.         Speech: Speech normal.         Behavior: Behavior normal. Behavior is cooperative.         Thought Content: Thought content does not include suicidal ideation.      Comments: Judgment/insight fair         Labs:    UDS: negative for everything on POC cup  *POC urine drug screen does not screen for Fentanyl      Patient counseling completed today:  Discussed mechanism of action, potential risks/benefits/side effects of medications and other recommendations above.  Discussed risk of precipitated withdrawal with initiation of buprenorphine in the presence of full opioid agonists.  Reviewed directions for initiation of buprenorphine to reduce risk of precipitated withdrawal and maximize efficacy.  Recommend pt keep naloxone in their " possession and reviewed other aspects of harm reduction to reduce risk of overdose with return to use.   Recommended avoiding concurrent use of alcohol, benzodiazepines or other sedatives with buprenorphine or other opioids.  Discussed importance of avoiding isolation, building a network of supportive relationships, avoiding people/places/things associated with past use to reduce risk of relapse; including motivational interviewing regarding psychosocial treatment for addiction.     DO AYAZ [facility]  [address] [place] [id number] [phone]

## 2022-09-10 LAB — CREAT UR-MCNC: 172 MG/DL

## 2022-09-13 ENCOUNTER — HOSPITAL ENCOUNTER (EMERGENCY)
Facility: CLINIC | Age: 37
Discharge: HOME | End: 2022-09-14
Attending: EMERGENCY MEDICINE | Admitting: EMERGENCY MEDICINE
Payer: COMMERCIAL

## 2022-09-13 ENCOUNTER — OFFICE VISIT (OUTPATIENT)
Dept: BEHAVIORAL HEALTH | Facility: CLINIC | Age: 37
End: 2022-09-13
Payer: COMMERCIAL

## 2022-09-13 ENCOUNTER — TELEPHONE (OUTPATIENT)
Dept: BEHAVIORAL HEALTH | Facility: CLINIC | Age: 37
End: 2022-09-13

## 2022-09-13 ENCOUNTER — OFFICE VISIT (OUTPATIENT)
Dept: BEHAVIORAL HEALTH | Facility: CLINIC | Age: 37
End: 2022-09-13

## 2022-09-13 DIAGNOSIS — F11.90 OPIOID USE DISORDER: Primary | ICD-10-CM

## 2022-09-13 DIAGNOSIS — F29 PSYCHOSIS, UNSPECIFIED PSYCHOSIS TYPE (H): ICD-10-CM

## 2022-09-13 DIAGNOSIS — R41.82 ALTERED MENTAL STATUS, UNSPECIFIED ALTERED MENTAL STATUS TYPE: ICD-10-CM

## 2022-09-13 DIAGNOSIS — R53.83 FATIGUE: ICD-10-CM

## 2022-09-13 DIAGNOSIS — F11.20 OPIOID USE DISORDER, SEVERE, DEPENDENCE (H): Primary | ICD-10-CM

## 2022-09-13 DIAGNOSIS — F60.2 ANTISOCIAL PERSONALITY DISORDER (H): ICD-10-CM

## 2022-09-13 DIAGNOSIS — F15.20 METHAMPHETAMINE USE DISORDER, SEVERE (H): ICD-10-CM

## 2022-09-13 LAB
ALBUMIN SERPL-MCNC: 4.1 G/DL (ref 3.4–5)
ALP SERPL-CCNC: 80 U/L (ref 40–150)
ALT SERPL W P-5'-P-CCNC: 32 U/L (ref 0–70)
ANION GAP SERPL CALCULATED.3IONS-SCNC: <1 MMOL/L (ref 3–14)
APAP SERPL-MCNC: <2 MG/L (ref 10–30)
AST SERPL W P-5'-P-CCNC: 33 U/L (ref 0–45)
BASOPHILS # BLD AUTO: 0.1 10E3/UL (ref 0–0.2)
BASOPHILS NFR BLD AUTO: 1 %
BILIRUB SERPL-MCNC: 0.9 MG/DL (ref 0.2–1.3)
BUN SERPL-MCNC: 19 MG/DL (ref 7–30)
CALCIUM SERPL-MCNC: 8.9 MG/DL (ref 8.5–10.1)
CHLORIDE BLD-SCNC: 109 MMOL/L (ref 94–109)
CO2 SERPL-SCNC: 31 MMOL/L (ref 20–32)
CREAT SERPL-MCNC: 1 MG/DL (ref 0.66–1.25)
EOSINOPHIL # BLD AUTO: 0.7 10E3/UL (ref 0–0.7)
EOSINOPHIL NFR BLD AUTO: 8 %
ERYTHROCYTE [DISTWIDTH] IN BLOOD BY AUTOMATED COUNT: 11.9 % (ref 10–15)
ETHANOL SERPL-MCNC: <0.01 G/DL
GFR SERPL CREATININE-BSD FRML MDRD: >90 ML/MIN/1.73M2
GLUCOSE BLD-MCNC: 75 MG/DL (ref 70–99)
HCT VFR BLD AUTO: 45.3 % (ref 40–53)
HGB BLD-MCNC: 15.7 G/DL (ref 13.3–17.7)
IMM GRANULOCYTES # BLD: 0 10E3/UL
IMM GRANULOCYTES NFR BLD: 0 %
LYMPHOCYTES # BLD AUTO: 2.9 10E3/UL (ref 0.8–5.3)
LYMPHOCYTES NFR BLD AUTO: 33 %
MCH RBC QN AUTO: 29.9 PG (ref 26.5–33)
MCHC RBC AUTO-ENTMCNC: 34.7 G/DL (ref 31.5–36.5)
MCV RBC AUTO: 86 FL (ref 78–100)
MONOCYTES # BLD AUTO: 0.9 10E3/UL (ref 0–1.3)
MONOCYTES NFR BLD AUTO: 11 %
NEUTROPHILS # BLD AUTO: 4.1 10E3/UL (ref 1.6–8.3)
NEUTROPHILS NFR BLD AUTO: 47 %
NRBC # BLD AUTO: 0 10E3/UL
NRBC BLD AUTO-RTO: 0 /100
PLATELET # BLD AUTO: 188 10E3/UL (ref 150–450)
POTASSIUM BLD-SCNC: 4 MMOL/L (ref 3.4–5.3)
PROT SERPL-MCNC: 7.6 G/DL (ref 6.8–8.8)
RBC # BLD AUTO: 5.25 10E6/UL (ref 4.4–5.9)
SALICYLATES SERPL-MCNC: <2 MG/DL
SODIUM SERPL-SCNC: 140 MMOL/L (ref 133–144)
WBC # BLD AUTO: 8.8 10E3/UL (ref 4–11)

## 2022-09-13 PROCEDURE — 96374 THER/PROPH/DIAG INJ IV PUSH: CPT

## 2022-09-13 PROCEDURE — 85014 HEMATOCRIT: CPT | Performed by: EMERGENCY MEDICINE

## 2022-09-13 PROCEDURE — 99285 EMERGENCY DEPT VISIT HI MDM: CPT | Performed by: EMERGENCY MEDICINE

## 2022-09-13 PROCEDURE — 96361 HYDRATE IV INFUSION ADD-ON: CPT

## 2022-09-13 PROCEDURE — 99285 EMERGENCY DEPT VISIT HI MDM: CPT | Mod: 25

## 2022-09-13 PROCEDURE — 80179 DRUG ASSAY SALICYLATE: CPT | Performed by: EMERGENCY MEDICINE

## 2022-09-13 PROCEDURE — 82077 ASSAY SPEC XCP UR&BREATH IA: CPT | Performed by: EMERGENCY MEDICINE

## 2022-09-13 PROCEDURE — 80053 COMPREHEN METABOLIC PANEL: CPT | Performed by: EMERGENCY MEDICINE

## 2022-09-13 PROCEDURE — 80143 DRUG ASSAY ACETAMINOPHEN: CPT | Performed by: EMERGENCY MEDICINE

## 2022-09-13 PROCEDURE — 36415 COLL VENOUS BLD VENIPUNCTURE: CPT | Performed by: EMERGENCY MEDICINE

## 2022-09-13 PROCEDURE — 250N000013 HC RX MED GY IP 250 OP 250 PS 637: Performed by: EMERGENCY MEDICINE

## 2022-09-13 PROCEDURE — 258N000003 HC RX IP 258 OP 636: Performed by: EMERGENCY MEDICINE

## 2022-09-13 PROCEDURE — 250N000011 HC RX IP 250 OP 636: Performed by: EMERGENCY MEDICINE

## 2022-09-13 RX ORDER — SODIUM CHLORIDE 450 MG/100ML
INJECTION, SOLUTION INTRAVENOUS
Status: DISCONTINUED
Start: 2022-09-13 | End: 2022-09-13 | Stop reason: WASHOUT

## 2022-09-13 RX ORDER — LORAZEPAM 2 MG/ML
1 INJECTION INTRAMUSCULAR ONCE
Status: COMPLETED | OUTPATIENT
Start: 2022-09-13 | End: 2022-09-13

## 2022-09-13 RX ORDER — LORAZEPAM 1 MG/1
1 TABLET ORAL ONCE
Status: COMPLETED | OUTPATIENT
Start: 2022-09-13 | End: 2022-09-13

## 2022-09-13 RX ORDER — SODIUM CHLORIDE 9 MG/ML
INJECTION, SOLUTION INTRAVENOUS CONTINUOUS
Status: DISCONTINUED | OUTPATIENT
Start: 2022-09-13 | End: 2022-09-14 | Stop reason: HOSPADM

## 2022-09-13 RX ADMIN — SODIUM CHLORIDE 1000 ML: 9 INJECTION, SOLUTION INTRAVENOUS at 17:00

## 2022-09-13 RX ADMIN — LORAZEPAM 1 MG: 2 INJECTION INTRAMUSCULAR; INTRAVENOUS at 17:00

## 2022-09-13 RX ADMIN — LORAZEPAM 1 MG: 1 TABLET ORAL at 16:08

## 2022-09-13 RX ADMIN — SODIUM CHLORIDE: 9 INJECTION, SOLUTION INTRAVENOUS at 19:00

## 2022-09-13 ASSESSMENT — ACTIVITIES OF DAILY LIVING (ADL)
ADLS_ACUITY_SCORE: 35

## 2022-09-13 ASSESSMENT — PATIENT HEALTH QUESTIONNAIRE - PHQ9: SUM OF ALL RESPONSES TO PHQ QUESTIONS 1-9: 9

## 2022-09-13 NOTE — NURSING NOTE
"Patient presented as a walk-in to the Recovery Clinic. Met with  therapist. Was roomed to see provider. Escorted by Dr. Hawthorne and Security to the ED for psych eval.    FERCHO for [facility] was signed by patient while in the Recovery Clinic. This writer called [facility] for collateral information. Per [facility] staff, Juliana, [phone], patient was taken to the [facility] to go directly to the ED, not the Recovery Clinic. Juliana was unsure how the patient arrived at the Recovery Clinic because he was supposed to go directly to the ED.    Per [facility] staff, patient had met with [facility] psychologist today and the determination was that the patient needs a Psych Eval in the ED for command hallucinations \"telling him to hurt people,\" responding to internal conversations, and laughing/talking to himself and auditory hallucinations. Juliana reported that the patient was given an envelope with recommendations from  the [facility] psychologist to take with him. Envelope was not seen in the Recovery Clinic.    Per [facility] staff, patient has not been taking any medications as prescribed. Informed Juliana at [facility] that the patient was escorted to the ED from the Recovery Clinic. Provided the contact # for the ED for Juliana to call to give report.    Africa Helton RN on 9/13/2022 at 2:43 PM    "
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
## 2022-09-13 NOTE — NURSING NOTE
[facility] - [facility]      Rooming information:  Approximate last use of illicit opioids: 9/6/22  Taking buprenorphine? Yes:  As prescribed? Yes:   Number of buprenorphine films/tablets remaining currently: 0  Side effects related to buprenorphine (constipation, dry mouth, sedation?) No   Narcan currently available: Yes  Other recent substance use:    Denies  NICOTINE-No    Point of care urine drug screen positive for:  no UA left  *POC urine drug screen does not screen for Fentanyl      PHQ Assesment Total Score(s) 9/13/2022   PHQ-9 Score 9   Some recent data might be hidden       If PHQ-9 score of 15 or higher, has Recovery Clinic therapist or provider been notified? N/A    Any current suicidal ideation?  states no but did make an one on the PHQ  If yes, has Recovery Clinic therapist or provider been notified? Yes    Primary care provider: Physician No Ref-Primary     Mental health provider: denies (follow up on MH referral if needed)    Insurance needs: active    Housing needs: stable at [facility]    Contact information up to date? yes    3rd Party Involvement none today (please obtain FERCHO if pt would like to include)    Pinky Walls CMA  September 13, 2022  1:12 PM

## 2022-09-13 NOTE — TELEPHONE ENCOUNTER
The client came to the client and he was having some emotional and thought issues-so this writer met with the client and he was not able to engage the conversation with this worker as he stated that he was not thinking good today and he had great challenges with engaging the conversation-as he stated he did not know how he came to the clinic-during this conversation his face was making multiple face from mean face grimace to half smile-and laughing as he was having internal dialogue-this writer completed a transport hold and talked with the nurse on in the clinic and talked with the medical provider-for the patient to go to the ED for assessment due to his dysregulation.

## 2022-09-13 NOTE — ED PROVIDER NOTES
ED Provider Note  [facility]      History     Chief Complaint   Patient presents with     Hallucinations     HPI  Delroy Ramirez is a 37 year old male with antisocial personality disorder, methamphetamine dependence (severe) who was sent [facility] to the  recovery clinic.  They sent him to the ER for evaluation.  Records indicate he has hx of psychosis. He is unable to given hx at this time.     Past Medical and Surgical History, Medications, Allergies, and Social History were reviewed in the electronic medical record. Review with the patient was attempted but limited due to altered mental status.       Review of Systems  A complete review of systems was attempted but limited due to altered mental status.    Physical Exam   BP: 128/88  Pulse: 90  Temp: 98.7  F (37.1  C)  Resp: 18  SpO2: 96 %  Physical Exam  Vitals and nursing note reviewed.   Constitutional:       Comments: Appears sun burned.  Eyes injected.  Awake and twitchy fingers.  Skin is normal temp.  Airway intact.  Unable to give hx.  Appears under the influence of some substance and cannot say.    HENT:      Head: Normocephalic and atraumatic.      Nose: No congestion or rhinorrhea.   Eyes:      General: Lids are normal.      Extraocular Movements: Extraocular movements intact.      Conjunctiva/sclera:      Right eye: Right conjunctiva is injected.      Left eye: Left conjunctiva is injected.   Cardiovascular:      Rate and Rhythm: Normal rate.   Pulmonary:      Effort: Pulmonary effort is normal.   Musculoskeletal:         General: No deformity or signs of injury.      Cervical back: Normal range of motion.      Comments: Fingers twitchy.  Some leg jerks.    Skin:     General: Skin is warm and dry.   Neurological:      Mental Status: He is alert.   Psychiatric:         Attention and Perception: He is inattentive.         ED Course      Procedures       The medical record was reviewed and interpreted.  Current labs reviewed and  interpreted.              Results for orders placed or performed during the hospital encounter of 09/13/22   Comprehensive metabolic panel     Status: Abnormal   Result Value Ref Range    Sodium 140 133 - 144 mmol/L    Potassium 4.0 3.4 - 5.3 mmol/L    Chloride 109 94 - 109 mmol/L    Carbon Dioxide (CO2) 31 20 - 32 mmol/L    Anion Gap <1 (L) 3 - 14 mmol/L    Urea Nitrogen 19 7 - 30 mg/dL    Creatinine 1.00 0.66 - 1.25 mg/dL    Calcium 8.9 8.5 - 10.1 mg/dL    Glucose 75 70 - 99 mg/dL    Alkaline Phosphatase 80 40 - 150 U/L    AST 33 0 - 45 U/L    ALT 32 0 - 70 U/L    Protein Total 7.6 6.8 - 8.8 g/dL    Albumin 4.1 3.4 - 5.0 g/dL    Bilirubin Total 0.9 0.2 - 1.3 mg/dL    GFR Estimate >90 >60 mL/min/1.73m2   Ethyl Alcohol Level     Status: Normal   Result Value Ref Range    Alcohol ethyl <0.01 <=0.01 g/dL   Acetaminophen level     Status: Abnormal   Result Value Ref Range    Acetaminophen <2 (L) 10 - 30 mg/L   Salicylate level     Status: Normal   Result Value Ref Range    Salicylate <2 <20 mg/dL   CBC with platelets and differential     Status: None   Result Value Ref Range    WBC Count 8.8 4.0 - 11.0 10e3/uL    RBC Count 5.25 4.40 - 5.90 10e6/uL    Hemoglobin 15.7 13.3 - 17.7 g/dL    Hematocrit 45.3 40.0 - 53.0 %    MCV 86 78 - 100 fL    MCH 29.9 26.5 - 33.0 pg    MCHC 34.7 31.5 - 36.5 g/dL    RDW 11.9 10.0 - 15.0 %    Platelet Count 188 150 - 450 10e3/uL    % Neutrophils 47 %    % Lymphocytes 33 %    % Monocytes 11 %    % Eosinophils 8 %    % Basophils 1 %    % Immature Granulocytes 0 %    NRBCs per 100 WBC 0 <1 /100    Absolute Neutrophils 4.1 1.6 - 8.3 10e3/uL    Absolute Lymphocytes 2.9 0.8 - 5.3 10e3/uL    Absolute Monocytes 0.9 0.0 - 1.3 10e3/uL    Absolute Eosinophils 0.7 0.0 - 0.7 10e3/uL    Absolute Basophils 0.1 0.0 - 0.2 10e3/uL    Absolute Immature Granulocytes 0.0 <=0.4 10e3/uL    Absolute NRBCs 0.0 10e3/uL   CBC with platelets differential     Status: None    Narrative    The following orders were  created for panel order CBC with platelets differential.  Procedure                               Abnormality         Status                     ---------                               -----------         ------                     CBC with platelets and d...[[id number]]                      Final result                 Please view results for these tests on the individual orders.     Medications   0.9% sodium chloride BOLUS (0 mLs Intravenous Stopped 9/13/22 1756)     Followed by   sodium chloride 0.9% infusion (has no administration in time range)   LORazepam (ATIVAN) tablet 1 mg (1 mg Oral Given 9/13/22 1608)   LORazepam (ATIVAN) injection 1 mg (1 mg Intravenous Given 9/13/22 1700)        Assessments & Plan (with Medical Decision Making)   Patient has hx of psychosis, antisocial personality disorder, and meth use and was brought from recovery clinic to ED.  He is unable to give hx. Epic review shows hx of meth use which may be the case today . However given lack of hx with do labs, IV fluids.  Also do to jerks and twitching, I gave some ativan to him.  Vitals are stable and despite his redness of skin, his skin is warm and slightly moist.  It seems more likely the redness if sun exposure locations and a sunburn.  He continues to be talking to himself and doing some jerks/twitching so ativan 1 mg IV given.  Patient is finally sleeping.  Will continue IV fluids.  Labs show no acute concerns.     Patient is sleeping and awakens to voice.  He seems improved from arrival but now very fatigued and slow.  Will be signed out to overnight MD and should be reassessed in am.     I have reviewed the nursing notes. I have reviewed the findings, diagnosis, plan and need for follow up with the patient.    New Prescriptions    No medications on file       Final diagnoses:   Altered mental status, unspecified altered mental status type - suspect drug use       --  Slime Joseph MD  [facility] EMERGENCY  DEPARTMENT  9/13/2022     Slime Joseph MD  09/13/22 2050       Slime Joseph MD  09/14/22 0011

## 2022-09-13 NOTE — PROGRESS NOTES
"Pt presented to Recovery Clinic for follow up.  Staff observed concerning behaviors.   When I met with pt, he was unable to state where he was or why he was here.  He endorsed hearing voices, denied command voices.  Conversation with staff at [facility] later disclosed pt was experiencing command auditory hallucinations.     Pt denied recent methamphetamine or other substance use.  Pt denied recent injuries.   Pt stated he was not taking buprenorphine.   Pt observed to be alternatingly chuckling and apparently starting to cry.  Conjunctivae injected.  Frequent repetitive movement of thumb and index/middle finger \"pill rolling\"   Recommended evaluation in ED, pt was escorted to ED and discussed presentation with ED staff who will be assuming his care.   Phone call to staff at [facility] revealed pt was intended to be evaluated in ED today, not Recovery Clinic.    "

## 2022-09-13 NOTE — NURSING NOTE
Follow up phone call to Juliana at [facility]. She reports that she spoke with a doctor in the [place] ED, gave report and provided her contact # for follow up ([phone]).    Africa Helton RN on 9/13/2022 at 2:51 PM

## 2022-09-14 ENCOUNTER — HOSPITAL ENCOUNTER (EMERGENCY)
Facility: CLINIC | Age: 37
Discharge: HOME | End: 2022-09-14
Attending: FAMILY MEDICINE | Admitting: FAMILY MEDICINE
Payer: COMMERCIAL

## 2022-09-14 VITALS
TEMPERATURE: 97.4 F | DIASTOLIC BLOOD PRESSURE: 81 MMHG | SYSTOLIC BLOOD PRESSURE: 143 MMHG | HEART RATE: 89 BPM | OXYGEN SATURATION: 98 % | RESPIRATION RATE: 16 BRPM

## 2022-09-14 VITALS
SYSTOLIC BLOOD PRESSURE: 128 MMHG | DIASTOLIC BLOOD PRESSURE: 77 MMHG | WEIGHT: 225 LBS | BODY MASS INDEX: 29.82 KG/M2 | HEART RATE: 96 BPM | HEIGHT: 73 IN | RESPIRATION RATE: 16 BRPM | OXYGEN SATURATION: 95 % | TEMPERATURE: 98.4 F

## 2022-09-14 DIAGNOSIS — F60.2 ANTISOCIAL PERSONALITY DISORDER (H): ICD-10-CM

## 2022-09-14 DIAGNOSIS — F41.9 ANXIETY: ICD-10-CM

## 2022-09-14 LAB — GABAPENTIN UR QL CFM: PRESENT

## 2022-09-14 PROCEDURE — 90791 PSYCH DIAGNOSTIC EVALUATION: CPT

## 2022-09-14 PROCEDURE — 99285 EMERGENCY DEPT VISIT HI MDM: CPT | Mod: 25 | Performed by: FAMILY MEDICINE

## 2022-09-14 PROCEDURE — 250N000013 HC RX MED GY IP 250 OP 250 PS 637: Performed by: FAMILY MEDICINE

## 2022-09-14 PROCEDURE — 99284 EMERGENCY DEPT VISIT MOD MDM: CPT | Performed by: FAMILY MEDICINE

## 2022-09-14 RX ORDER — OLANZAPINE 5 MG/1
5 TABLET ORAL
Qty: 10 TABLET | Refills: 0 | Status: SHIPPED | OUTPATIENT
Start: 2022-09-14 | End: 2022-09-14

## 2022-09-14 RX ORDER — OLANZAPINE 5 MG/1
5 TABLET ORAL
Qty: 10 TABLET | Refills: 0 | Status: SHIPPED | OUTPATIENT
Start: 2022-09-14

## 2022-09-14 RX ORDER — OLANZAPINE 10 MG/1
10 TABLET, ORALLY DISINTEGRATING ORAL ONCE
Status: COMPLETED | OUTPATIENT
Start: 2022-09-14 | End: 2022-09-14

## 2022-09-14 RX ADMIN — OLANZAPINE 10 MG: 10 TABLET, ORALLY DISINTEGRATING ORAL at 10:42

## 2022-09-14 ASSESSMENT — ACTIVITIES OF DAILY LIVING (ADL)
ADLS_ACUITY_SCORE: 35

## 2022-09-14 NOTE — ED TRIAGE NOTES
"Patient was pacing outside the ER entrance for the past hour.  Poor eye contact.  Patient states \"I had a follow up appointment but have no papers\".  When asked what type of appointment he could not answer.  Patient seen here yesterday     Triage Assessment     Row Name 09/14/22 0851       Triage Assessment (Adult)    Airway WDL WDL       Respiratory WDL    Respiratory WDL WDL       Skin Circulation/Temperature WDL    Skin Circulation/Temperature WDL WDL       Cardiac WDL    Cardiac WDL WDL       Peripheral/Neurovascular WDL    Peripheral Neurovascular WDL WDL       Cognitive/Neuro/Behavioral WDL    Cognitive/Neuro/Behavioral WDL WDL              "

## 2022-09-14 NOTE — PROGRESS NOTES
"[facility]    Peer  met with Delroy Ramirez in the Recovery Clinic to introduce himself, detail services provided and discuss current status of recovery.     Pt did not appear alert, oriented and open to feedback during our discussion. When CLAUDINE asked how the pt was doing today his response was a very blunt \"fine\" without making eye contact.   CLAUDINE observed pt to by anxiously bouncing his legs in an fast manner, staring intently at the clinic intake form, making various facial expressions and occasionally laughing out loud while talking to himself.      CLAUDINE immediately notified [place] JEAN - Recovery Clinic nurse and Andrew Smith - [facility] psychotherapist of his observations of the pt.    [facility] had no further involvement or interaction with the pt during this visit.         Service Type:     Individual     Session Start Time:        1:00 PM                 Session End Time:           1:15 PM    Session Length:        15 minutes     Patient Goal: To utilize suboxone assisted treatment for sobriety and long term recovery.     Goal Progress:   Ongoing.    Key Risk Factors to Recovery:   [facility] encourages being aware of risk factors that can lead to re-use which include avoiding isolation, avoiding triggers and managing cravings in a healthy manner. being open and willing to acceptance and change on a daily basis.  PRC encourages pt to establish a sober network calling tree to reach out to when needed.  Continue to practice honesty with ourselves and trusted support person(s).   [facility] encourages regular attendance at recovery based meetings as well as finding a sponsor for mentoring and accountability.   PRC encourages consideration of other services such as counseling for mental health issues which can correlate with our substance use.      Support Needs:   Ongoing care, support and resources for opioid substance use disorder.     Follow up:   CLAUDINE has provided pt with his contact " information for support and resource needs.    PRC and pt agree to meet during an upcoming  visit.       [facility]  [address], [address]   [place], [place], [address]  Clinic Phone: [phone]  Clinic Fax: [phone]  Peer  phone: [phone]    Open Monday - Friday  9:00am-4:00pm  Walk in hours: 9am-3pm      Hiram Gates  September 14, 2022  9:49 AM    Renee Velasquez MA, [facility] provides clinical  oversite and supervision of care.

## 2022-09-14 NOTE — ED NOTES
Assumed care of pt, pt sitting in chair, interacting with staff appropriately. Pt did eat his breakfast per him. Pt now resting quietly.

## 2022-09-14 NOTE — ED NOTES
Pt given discharge paperwork, ambulatory independently. [facility] staff waiting with pt while he waits for his uber to his facility.

## 2022-09-14 NOTE — DISCHARGE INSTRUCTIONS
As discussed, we have scheduled you for a medication management appointment. Below are the details:   Date: Wednesday, 9/21/2022  Time: 1:00 pm - 2:00 pm  Provider: Jean Brooks  MSN  CNP,PMBAYRONP,RN  Location: [facility], [address] [place] [address] [place] [address]  Phone: [phone]  This is a telehealth appointment. We have provided the provider with your e-mail address and the main telephone number for [facility].   Please call them at the number above you if have any questions about the appointment.    Aftercare Plan  If I am feeling unsafe or I am in a crisis, I will:   Contact my established care providers   Call the National Suicide Prevention Lifeline: 988  Go to the nearest emergency room   Call 911     Warning signs that I or other people might notice when a crisis is developing for me:   Hearing voices or seeing things that others do not hear; thoughts of harming others or myself  Feeling like you can't think clearly enough to care for yourself    Things I am able to do on my own or with others to cope or help me feel better:   Talk with peers/staff at [facility]  Watch TV/movies    Try square breathing:   Begin by slowly exhaling all of your air out.  Then, gently inhale through your nose to a slow count of 4.  Hold at the top of the breath for a count of 4.  Then gently exhale through your mouth for a count of 4.  At the bottom of the breath, pause and hold for the count of 4.    Changes I can make to support my mental health and wellness:   Avoid use of drugs and alcohol  Take your medications as prescribed  Practice good self-care: try to get 8 hours of sleep nightly, incorporate physical activity/exercise daily, spend time outdoors for fresh air/sunshine.     People in my life that I can ask for help:   [facility] staff  Gene     Your [facility] has a mental health crisis team you can call 24/7: [facility]  [phone]     Other things that are important when I'm in  "crisis: Remember there are people who want to help - reach out and ask for what you need.    Additional resources and information:       Crisis Lines  Crisis Text Line  Text 654082  You will be connected with a trained live crisis counselor to provide support.    Frank rincon, concepciono  LOGAN a 289388 o texto a 442-AYUDAME en WhatsApp    The [employer] (LGBTQ Youth Crisis Line)  8.872.905.2138  text START to 783-322      [employer]  Fast Tracker  Linking people to mental health and substance use disorder resources  [employer]     [place] Mental Health Warm Line  Peer to peer support  Monday thru Saturday, 12 pm to 10 pm  730.327.2441 or 7.453.580.4237  Text \"Support\" to 78428    National Worth on Mental Illness (MIKE)  843.369.0921 or 1.888.MIKE.HELPS      Mental Health Apps  My3  https://MarkTend.org/    VirtualHopeBox  https://[employer]/apps/virtual-hope-box/      Additional Information  Today you were seen by a licensed mental health professional through Triage and Transition services, Behavior[facility] Providers ([facility])  for a crisis assessment in the Emergency Department at [facility].  It is recommended that you follow up with your established providers (psychiatrist, mental health therapist, and/or primary care doctor - as relevant) as soon as possible. Coordinators from [facility] will be calling you in the next 24-48 hours to ensure that you have the resources you need.  You can also contact [facility] coordinators directly at [phone]. You may have been scheduled for or offered an appointment with a mental health provider. [facility] maintains an extensive network of licensed behavioral health providers to connect patients with the services they need.  We do not charge providers a fee to participate in our referral network.  We match patients with providers based on a patient's specific needs, insurance coverage, and location.  Our first effort will be to refer you to a " provider within your care system, and will utilize providers outside your care system as needed.

## 2022-09-14 NOTE — ED PROVIDER NOTES
"    [facility] EMERGENCY DEPARTMENT ([facility])    9/14/22     [facility]      History     Chief Complaint   Patient presents with     Psychiatric Evaluation     Patient was seen here yesterday for MH evaluation and discharged this am.  Back this am stating \"I need a mental health evaluation\"  Denies SI/I/AH/VH     The history is provided by medical records ([facility] staff). The history is limited by the condition of the patient (possible altered mental status).     Delroy Ramirez is a 37 year old male with history of antisocial personality disorder, meth use, psychosis who presents the emergency department for psychiatric evaluation.  Patient was seen by DEC , please see her note for further details.  Patient has been staying at [facility] treatment facility.  [facility] staff state that 5 days ago was alert, conversant and interactive.  However over the past 2 days the patient has been less interactive, has been sitting and staring for hours and then suddenly laughing and crying. He was sent into the emergency department yesterday by [facility] staff due to these changes in demeanor.  He was seen by Dr. Joseph who noted that he was unable to give history at the time.  She was concern for possible meth use.  Labs were performed and he was given some Ativan due to the jerking and twitching.  He fell asleep with this but seemed very fatigued and slow.  He was signed out to overnight doc with plan for reassessment in the morning.  It does not appear that he was assessed.  He was discharged back to [facility] at 230 this morning.  He was sent back to the ED as he didn't come back with a discharge summary and they didn't know if he was even assessed.  [facility] staff that he is currently there for treatment of meth and he has had negative urine tox screens.  Here patient denies any auditory or visual hallucinations, states that he feels well enough to return to [facility].  With his current demeanor he can't even cooperate in group.  DEC " " notes that he continues to have an intense gaze, staring eye contact and seems internally preoccupied.  He is currently on mirtazapine, Suboxone and risperidone, does not have a psychiatrist. He states he was in [facility] up until September this year.     Past Medical History  No past medical history on file.  No past surgical history on file.  OLANZapine (ZYPREXA) 5 MG tablet  buprenorphine-naloxone (SUBOXONE) 8-2 MG SUBL sublingual tablet  cloNIDine (CATAPRES) 0.1 MG tablet  mirtazapine (REMERON) 15 MG tablet  naloxone (NARCAN) 4 MG/0.1ML nasal spray  ondansetron (ZOFRAN ODT) 4 MG ODT tab  risperiDONE (RISPERDAL) 2 MG tablet      No Known Allergies  Family History  No family history on file.  Social History   Social History     Tobacco Use     Smoking status: Never Smoker     Smokeless tobacco: Never Used   Substance Use Topics     Alcohol use: Yes     Drug use: No      Past medical history, past surgical history, medications, allergies, family history, and social history were reviewed with the patient. No additional pertinent items.       Review of Systems  A complete review of systems was performed with pertinent positives and negatives noted in the HPI, and all other systems negative.    Physical Exam   BP: 118/80  Pulse: 102  Temp: 98.5  F (36.9  C)  Resp: 17  Height: 185.4 cm (6' 1\")  Weight: 102.1 kg (225 lb)  SpO2: 93 %  Physical Exam  Vitals and nursing note reviewed.   Constitutional:       General: He is not in acute distress.     Appearance: He is not diaphoretic.   HENT:      Head: Atraumatic.   Eyes:      General: No scleral icterus.     Pupils: Pupils are equal, round, and reactive to light.   Cardiovascular:      Heart sounds: Normal heart sounds.   Pulmonary:      Effort: No respiratory distress.      Breath sounds: Normal breath sounds.   Abdominal:      General: Bowel sounds are normal.      Palpations: Abdomen is soft.      Tenderness: There is no abdominal tenderness.   Musculoskeletal:   "       General: No tenderness.   Skin:     General: Skin is warm.      Findings: No rash.   Neurological:      General: No focal deficit present.      Mental Status: He is oriented to person, place, and time.   Psychiatric:         Attention and Perception: Attention normal.         Mood and Affect: Mood is anxious. Affect is labile.         Speech: Speech normal.         Behavior: Behavior normal.         Thought Content: Thought content normal.         Cognition and Memory: Cognition normal.         Judgment: Judgment normal.      Comments: Patient does not appear to be responding to internal stimuli, he is anxious, affect somewhat labile but easily calms         ED Course      Procedures       The medical record was reviewed and interpreted.              Results for orders placed or performed during the hospital encounter of 09/13/22   Comprehensive metabolic panel     Status: Abnormal   Result Value Ref Range    Sodium 140 133 - 144 mmol/L    Potassium 4.0 3.4 - 5.3 mmol/L    Chloride 109 94 - 109 mmol/L    Carbon Dioxide (CO2) 31 20 - 32 mmol/L    Anion Gap <1 (L) 3 - 14 mmol/L    Urea Nitrogen 19 7 - 30 mg/dL    Creatinine 1.00 0.66 - 1.25 mg/dL    Calcium 8.9 8.5 - 10.1 mg/dL    Glucose 75 70 - 99 mg/dL    Alkaline Phosphatase 80 40 - 150 U/L    AST 33 0 - 45 U/L    ALT 32 0 - 70 U/L    Protein Total 7.6 6.8 - 8.8 g/dL    Albumin 4.1 3.4 - 5.0 g/dL    Bilirubin Total 0.9 0.2 - 1.3 mg/dL    GFR Estimate >90 >60 mL/min/1.73m2   Ethyl Alcohol Level     Status: Normal   Result Value Ref Range    Alcohol ethyl <0.01 <=0.01 g/dL   Acetaminophen level     Status: Abnormal   Result Value Ref Range    Acetaminophen <2 (L) 10 - 30 mg/L   Salicylate level     Status: Normal   Result Value Ref Range    Salicylate <2 <20 mg/dL   CBC with platelets and differential     Status: None   Result Value Ref Range    WBC Count 8.8 4.0 - 11.0 10e3/uL    RBC Count 5.25 4.40 - 5.90 10e6/uL    Hemoglobin 15.7 13.3 - 17.7 g/dL     Hematocrit 45.3 40.0 - 53.0 %    MCV 86 78 - 100 fL    MCH 29.9 26.5 - 33.0 pg    MCHC 34.7 31.5 - 36.5 g/dL    RDW 11.9 10.0 - 15.0 %    Platelet Count 188 150 - 450 10e3/uL    % Neutrophils 47 %    % Lymphocytes 33 %    % Monocytes 11 %    % Eosinophils 8 %    % Basophils 1 %    % Immature Granulocytes 0 %    NRBCs per 100 WBC 0 <1 /100    Absolute Neutrophils 4.1 1.6 - 8.3 10e3/uL    Absolute Lymphocytes 2.9 0.8 - 5.3 10e3/uL    Absolute Monocytes 0.9 0.0 - 1.3 10e3/uL    Absolute Eosinophils 0.7 0.0 - 0.7 10e3/uL    Absolute Basophils 0.1 0.0 - 0.2 10e3/uL    Absolute Immature Granulocytes 0.0 <=0.4 10e3/uL    Absolute NRBCs 0.0 10e3/uL   CBC with platelets differential     Status: None    Narrative    The following orders were created for panel order CBC with platelets differential.  Procedure                               Abnormality         Status                     ---------                               -----------         ------                     CBC with platelets and d...[[id number]]                      Final result                 Please view results for these tests on the individual orders.     Medications   OLANZapine zydis (zyPREXA) ODT tab 10 mg (10 mg Oral Given 9/14/22 1042)        Assessments & Plan (with Medical Decision Making)   A 37-year-old male with a history of antisocial personality disorder, polysubstance abuse, substance-induced psychosis, and anxiety.  Was evaluated here yesterday and discharged back to his treatment program but then returned because he did not have his discharge paperwork.  Denies any thoughts of harm to self or others, denies any symptoms of psychosis and does not appear to respond to internal stimuli.  He is somewhat anxious and can be labile.  He denies that he is relapsed using drugs and per chart has not tested positive for any substances recently.  The patient was also seen by the [facility] , please refer to their extensive note/evaluation which was  reviewed with me and is documented in EPIC on 9/14/2022 for further details.  Patient stated he had not slept, and so was given Zyprexa and allowed to sleep for several hours.  He subsequently woke up a lunch and was asking to leave.  He does not appear to be exhibiting signs of acute psychosis and denies suicidal or homicidal thoughts.  He does not meet any grounds for involuntary hold and prefers to return to his treatment program at [facility].  They will send it over to pick him up.  Outpatient resources provided.  Stable for discharge.  Was given a as needed prescription for Zyprexa if he has issues with sleep.    I have reviewed the nursing notes. I have reviewed the findings, diagnosis, plan and need for follow up with the patient.    New Prescriptions    OLANZAPINE (ZYPREXA) 5 MG TABLET    Take 1 tablet (5 mg) by mouth nightly as needed (Sleep, anxiety)       Final diagnoses:   Anxiety   Antisocial personality disorder (H)       I, Leigh Ann Swain, am serving as a trained medical scribe to document services personally performed by Fracisco Clark MD based on the provider's statements to me on September 14, 2022.  This document has been checked and approved by the attending provider.    IFracisco MD, was physically present and have reviewed and verified the accuracy of this note documented by Leigh Ann Swain, medical scribe.      Fracisco Clark MD     [facility] EMERGENCY DEPARTMENT  9/14/2022     Fracisco Clark MD  09/14/22 5081

## 2022-09-14 NOTE — CONSULTS
Diagnostic Evaluation Consultation  Crisis Assessment    Patient was assessed: In Person  Patient location: [facility] ED  Was a release of information signed: [facility]      Referral Data and Chief Complaint  Delroy is a 37 year old, who uses he/him pronouns, and presents to the ED other: [facility] hospital staff. Patient is referred to the ED by community provider(s). Patient is presenting to the ED for the following concerns: signs of psychosis.      Informed Consent and Assessment Methods  Patient is his own guardian. Writer met with patient and explained the crisis assessment process, including applicable information disclosures and limits to confidentiality, assessed understanding of the process, and obtained consent to proceed with the assessment. Patient was observed to be able to participate in the assessment as evidenced by oriented, able to participate. Assessment methods included conducting a formal interview with patient, review of medical records, collaboration with medical staff, and obtaining relevant collateral information from family and community providers when available..   Over the course of this crisis assessment provided reassurance, offered validation, engaged patient in problem solving and disposition planning and worked with patient on safety and aftercare planning. Patient's response to interventions was minimal.     Summary of Patient Situation  Patient seen in the ED yesterday for altered mental status and discharged back to [facility]. He had presented as a walk-in to the [facility] but was escorted to the ED. Per Critical access hospital staff, pt had been instructed to go to the ED not the Recovery Clinic. Per [facility] staff, patient had met with [facility] psychologist yesterday and the determination was that the patient needed a psych eval in the ED for command hallucinations 'telling him to hurt people,' responding to internal conversations, and laughing/talking to himself and auditory  hallucinations.     Pt present with signs and symptoms of psychosis. His affect is flat but also inappropriate at times where he suddenly began laughing and smiling for no reason. Eye contact is intense. He provides brief answers and provides minimal history. His head is shaking slightly. He appears disheveled. He denies all mental health symptoms when asked. Denies hallucinations. Denies paranoia. He denies SI/HI/plan/intent. He reports that he feels fine to go back to [facility]. He denies any recent use of substances.  He states that he is just tired as he has not been sleeping well.     Pt was given a dose of Zyprexa in the ED and allowed to sleep for several hours. He was re-assessed and appears improved. His eye contact is more appropriately engaged. He again denies psychosis symptoms and denies SI/HI/plan/intent. He reports feeling safe and ready to return to [facility]. He is open to referral for psychiatry.    Brief Psychosocial History  Pt reports that he was recently released from long-term in early September. He started at [facility] 5 days ago. He does not have a permanent residence. He reports his step-father is his primary support.     Significant Clinical History  Per chart, multiple mental health diagnoses are listed including Antisocial Personality Disorder, psychosis, methamphetamine use disorder and amphetamine induced psychosis. He has had multiple ED visits and per chart most recent  admission is in 2018.     Collateral Information  Attempted to reach Juliana at [facility]: [phone] but she is unfortunately out of the office today. [facility] staff report that they were hoping that pt could have a medication adjustment. They report that his behavior has changed in the last two days; he sits and stares for hours going from crying to laughing. He is not able to participate in their programming currently but they are willing to work with him if he has medications. They do not have access to  psychiatry.     Talked with nurse [phone] after pt was re-assessed and informed of pan to return there with prescription for Zyprexa and medication management appointment in one week.       Risk Assessment  ESS-6  1.a. Over the past 2 weeks, have you had thoughts of killing yourself? No  1.b. Have you ever attempted to kill yourself and, if yes, when did this last happen? No   2. Recent or current suicide plan? No   3. Recent or current intent to act on ideation? No  4. Lifetime psychiatric hospitalization? Yes  5. Pattern of excessive substance use? Yes  6. Current irritability, agitation, or aggression? No  Scoring note: BOTH 1a and 1b must be yes for it to score 1 point, if both are not yes it is zero. All others are 1 point per number. If all questions 1a/1b - 6 are no, risk is negligible. If one of 1a/1b is yes, then risk is mild. If either question 2 or 3, but not both, is yes, then risk is automatically moderate regardless of total score. If both 2 and 3 are yes, risk is automatically high regardless of total score.   Score: 2, mild risk      Does the patient have access to lethal means? No     Does the patient engage in non-suicidal self-injurious behavior (NSSI/SIB)? no     Does the patient have thoughts of harming others? No     Is the patient engaging in sexually inappropriate behavior?  no        Current Substance Abuse  Is there recent substance abuse? no - reports last use of methamphetamine 4 months ago; [facility] staff reports UA's have been negative.  Was a urine drug screen or blood alcohol level obtained: No       Mental Status Exam   Affect: Flat and Other: inappropriate at times   Appearance: Disheveled    Attention Span/Concentration: Inattentive  Eye Contact: Intense   Fund of Knowledge: Delayed    Language /Speech Content: Fluent   Language /Speech Volume: Normal    Language /Speech Rate/Productions: Minimally Responsive and Normal    Recent Memory: Variable   Remote Memory: Variable   Mood:  Apathetic and Normal    Orientation to Person: Yes    Orientation to Place: Yes   Orientation to Time of Day: Yes    Orientation to Date: Yes    Situation (Do they understand why they are here?): Yes    Psychomotor Behavior: Normal    Thought Content: Denies; exhibiting signs of responding to internal stimuli with laughing/smiling for no reason   Thought Form: Intact      History of commitment: No     Medication  Per [facility]:   Buprenorhpine-naloxone 8-2 sublingual 2 times daily  Mirtazapine 15 mg  Risperidone 2 mg     Medication changes made in the last two weeks: No       Current Care Team  Primary Care Provider: No  Psychiatrist: No  Therapist: No  : No     CTSS or ARMHS: No  ACT Team: No  Other: No      Diagnosis  298.9 (F29)  Unspecified Schizophrenia Spectrum by history    Clinical Summary and Substantiation of Recommendations    Diagnosis is based on history; pt denies but is exhibiting signs of psychosis. He appears internally preoccupied with laughing and smiling occasionally at inappropriate times. In the past his psychosis has been substance induced per chart; his treatment center reports that his UA's have been negative. Unsure who or when pt's medications were managed/adjusted as he was just released from longterm two weeks ago. He has no mental health providers.   Pt was given a dose of Zyprexa in ED and slept for several hours then re-assessed. He is coherent and engages in conversation. He again denies any symptoms of psychosis. Denies SI/HI/plan/intent. He is not presenting with any immediate risk of harm to self or others. He is showing improvement and does not currently appear to be responding to internal stimuli. Recommend return to residential treatment center with prescription for Zyprexa and follow up in one week.     Disposition  Recommended disposition: Medication Management and Substance Abuse Disorder Treatment       Reviewed case and recommendations with attending  provider. Attending Name: Fracisco Clark MD       Attending concurs with disposition: Yes       Patient concurs with disposition: Yes       Guardian concurs with disposition: NA      Final disposition: Medication management and Substance abuse disorder treatment . Will return [facility]. Scheduled for psychiatry follow-up one week ago.    Outpatient Details (if applicable):   Aftercare plan and appointments placed in the AVS and provided to patient: Yes. Given to patient by ED staff.    Was lethal means counseling provided as a part of aftercare planning? No;       Assessment Details  Patient interview started at: 930 am and completed at: 945 am.     Total duration spent on the patient case in minutes: .50 hrs      CPT code(s) utilized: 63348 - Psychotherapy for Crisis - 60 (30-74*) min       Tracey Louise, [facility] - Triage & Transition Services  Callback: [phone]    As discussed, we have scheduled you for a medication management appointment. Below are the details:   Date: Wednesday, 9/21/2022  Time: 1:00 pm - 2:00 pm  Provider: Jean Brooks  MSN  CNP,PMHNP,RN  Location: [address] [address]  Phone: [phone]  This is a telehealth appointment. We have provided the provider with your e-mail address and the main telephone number for [facility].   Please call them at the number above you if have any questions about the appointment.    Aftercare Plan  If I am feeling unsafe or I am in a crisis, I will:   Contact my established care providers   Call the National Suicide Prevention Lifeline: 988  Go to the nearest emergency room   Call 911     Warning signs that I or other people might notice when a crisis is developing for me:   Hearing voices or seeing things that others do not hear; thoughts of harming others or myself  Feeling like you can't think clearly enough to care for yourself    Things I am able to do on my own  "or with others to cope or help me feel better:   Talk with peers/staff at Critical access hospital  Watch TV/movies    Try square breathing:   Begin by slowly exhaling all of your air out.  Then, gently inhale through your nose to a slow count of 4.  Hold at the top of the breath for a count of 4.  Then gently exhale through your mouth for a count of 4.  At the bottom of the breath, pause and hold for the count of 4.    Changes I can make to support my mental health and wellness:   Avoid use of drugs and alcohol  Take your medications as prescribed  Practice good self-care: try to get 8 hours of sleep nightly, incorporate physical activity/exercise daily, spend time outdoors for fresh air/sunshine.     People in my life that I can ask for help:   Critical access hospital staff  Gene     [facility] has a mental health crisis team you can call 24/7: [facility] Mobile Crisis  [phone]     Other things that are important when I'm in crisis: Remember there are people who want to help - reach out and ask for what you need.    Additional resources and information:       Crisis Lines  Crisis Text Line  Text 990046  You will be connected with a trained live crisis counselor to provide support.    Por espanol, texto  LOGAN a 703083 o texto a 442-AYUDAME en WhatsApp    The [employer] (LGBTQ Youth Crisis Line)  1.143.722.1441  text START to 899-853      Community Resources  Fast Tracker  Linking people to mental health and substance use disorder resources  fasttrackermn.org     [place] Mental Health Warm Line  Peer to peer support  Monday thru Saturday, 12 pm to 10 pm  747.361.3588 or 8.995.241.5531  Text \"Support\" to 05854    National [place] on Mental Illness (MIKE)  734.301.0703 or 1.888.MIKE.HELPS      Mental Health Apps  My3  https://myTodacellpp.org/    VirtualHopeBox  https://[employer].org/apps/virtual-hope-box/      Additional Information  Today you were seen by a licensed mental health professional through Triage and Transition " services, Behavioral Healthcare Providers ([facility])  for a crisis assessment in the Emergency Department at [facility].  It is recommended that you follow up with your established providers (psychiatrist, mental health therapist, and/or primary care doctor - as relevant) as soon as possible. Coordinators from [facility] will be calling you in the next 24-48 hours to ensure that you have the resources you need.  You can also contact [facility] coordinators directly at [phone]. You may have been scheduled for or offered an appointment with a mental health provider. [facility] maintains an extensive network of licensed behavioral health providers to connect patients with the services they need.  We do not charge providers a fee to participate in our referral network.  We match patients with providers based on a patient's specific needs, insurance coverage, and location.  Our first effort will be to refer you to a provider within your care system, and will utilize providers outside your care system as needed.

## 2022-09-16 ENCOUNTER — TELEPHONE (OUTPATIENT)
Dept: BEHAVIORAL HEALTH | Facility: CLINIC | Age: 37
End: 2022-09-16

## 2022-09-16 NOTE — TELEPHONE ENCOUNTER
Riana, the  for [facility] returned RN's call. She states the patient will not be coming in for today's appointment and that they are having him taken back into custody today. No further details. Notified  staff and cancelled appointment.     Madeline Alfred RN on 9/16/2022 at 9:34 AM

## 2022-09-16 NOTE — TELEPHONE ENCOUNTER
RN attempted to call Anjum to assess the status this mutual patient. Patient has an appointment in the Recovery Clinic today at 1300. Patient was experiencing acute mental health symptoms when he last presented to the  on 9/13/22.  nurse communicated with [facility] staff and patient was actually suppose to present to the ED. Potential option communicated within the  team is to present to [facility] ED to access help through the Empath unit where FILOMENA can also be addressed. Awaiting a call back to assess patient needs and collaborate care. If patient presents to the , will request to have staff present.     Madeline Alfred RN on 9/16/2022 at 9:24 AM

## 2023-06-10 ENCOUNTER — HOSPITAL ENCOUNTER (EMERGENCY)
Facility: HOSPITAL | Age: 38
Discharge: HOME | End: 2023-06-10
Attending: EMERGENCY MEDICINE | Admitting: EMERGENCY MEDICINE
Payer: COMMERCIAL

## 2023-06-10 VITALS
TEMPERATURE: 98.8 F | SYSTOLIC BLOOD PRESSURE: 136 MMHG | OXYGEN SATURATION: 98 % | RESPIRATION RATE: 18 BRPM | DIASTOLIC BLOOD PRESSURE: 67 MMHG | HEART RATE: 63 BPM

## 2023-06-10 DIAGNOSIS — Z59.00 HOMELESS: ICD-10-CM

## 2023-06-10 DIAGNOSIS — M25.579 PAIN IN JOINT, ANKLE AND FOOT, UNSPECIFIED LATERALITY: ICD-10-CM

## 2023-06-10 PROBLEM — F19.951 SUBSTANCE-INDUCED PSYCHOTIC DISORDER WITH HALLUCINATIONS (H): Status: ACTIVE | Noted: 2017-08-20

## 2023-06-10 PROBLEM — F15.20 METHAMPHETAMINE USE DISORDER, SEVERE (H): Status: ACTIVE | Noted: 2017-08-20

## 2023-06-10 PROCEDURE — 99283 EMERGENCY DEPT VISIT LOW MDM: CPT

## 2023-06-10 SDOH — ECONOMIC STABILITY - HOUSING INSECURITY: HOMELESSNESS UNSPECIFIED: Z59.00

## 2023-06-11 NOTE — DISCHARGE INSTRUCTIONS
Contact one of the provided shelters for housing assistance.  Return to the ER for worsening symptoms or other concerns.

## 2023-06-11 NOTE — ED NOTES
Bed: JNED-09  Expected date: 6/10/23  Expected time: 9:18 PM  Means of arrival: Ambulance  Comments:  Bilateral leg pain

## 2023-06-11 NOTE — ED TRIAGE NOTES
Pt arrives to ED via [facility] with complaints of bilaterally ankle pain. Pt is homeless and told EMS he was walking all day today. Pt denies any injury. Reports no other concerns at this time. VSS.

## 2023-06-11 NOTE — ED NOTES
"Pt given resources for homeless shelters and placed to get food. Pt states \"just give me a couple hours. I need to rest.\"   RN replied that we are unable to do that and that this is an emergency room.   Pt states \"just give me 20 minutes\"  RN stated \"I can't do that sir, you have been discharged by the doctor.\"   Pt states \"I can't walk.\"   Pt got up and walked out the door with resources and magazine that he was reading on the way into the ER.   "

## 2023-06-11 NOTE — ED PROVIDER NOTES
EMERGENCY DEPARTMENT ENCOUnter      NAME: Delroy Ramirez  AGE: 37 year old male  YOB: 1985  MRN: [id number]  EVALUATION DATE & TIME: 6/10/2023  9:25 PM    PCP: No Ref-Primary, Physician    ED PROVIDER: Kemal Almazan DO      Chief Complaint   Patient presents with     Ankle Pain       FINAL IMPRESSION:  1. Pain in joint, ankle and foot, unspecified laterality    2. Homeless        ED COURSE & MEDICAL DECISION MAKIN:24 PM I met with the patient, obtained history, performed an initial exam, and discussed options and plan for diagnostics and treatment here in the ED. Room 9.        The patient arrived to the emergency department via EMS after he called them for complaints of ankle pain.  He had been walking throughout most of the day and is homeless.  At the time of arrival, he stated that his ankles were sore but denied any injury.  He stated that he just wanted some place to rest.  He denies any other complaints.  He has no concerning physical exam findings.  He is able to walk safely.  At this time I do not see any need for imaging.  The patient will be given resources for the homeless shelter and will be discharged.      Medical Decision Making    History:    Supplemental history from: Documented in chart, if applicable    External Record(s) reviewed: Documented in chart, if applicable.    Work Up:    Chart documentation includes differential considered and any EKGs or imaging independently interpreted by provider, where specified.    In additional to work up documented, I considered the following work up: Documented in chart, if applicable.    External consultation:    Discussion of management with another provider: Documented in chart, if applicable    Complicating factors:    Care impacted by chronic illness: N/A    Care affected by social determinants of health: Alcohol Abuse and/or Recreational Drug Use and Housing Insecurity    Disposition considerations: Discharge. No recommendations  on prescription strength medication(s). See documentation for any additional details.        At the conclusion of the encounter I discussed the results of all of the tests and the disposition. The questions were answered. The patient or family acknowledged understanding and was agreeable with the care plan.       =================================================================    HPI    Delroy Ramirez is a 37 year old male with a pertinent history of substance abuse who presents to this ED by EMS for evaluation of ankle pain. Patient is homeless and arrives by EMS who report that patient lives in [place] and has been walking all day today. Reportedly went for a walk and got lost and ended up in [place]. When he called EMS initially he was stating that he wanted a ride to [place]. On EMS arrival he started complaining of bilateral knee and ankle pain. No injuries. Vitally stable for EMS.    Patient complains of bilateral ankle pain. He denies any injuries. He is able to ambulate. States that he just wants to rest. Denies any other complaints or concerns.      REVIEW OF SYSTEMS  Constitutional:  Denies fever or chills  HENT:  Denies sore throat   Respiratory:  Denies cough or shortness of breath   Cardiovascular:  Denies chest pain or palpitations  GI:  Denies abdominal pain, nausea, or vomiting  Musculoskeletal:  Positive for bilateral ankle and knee pain.  Skin:  Denies rash   Neurologic:  Denies headache, focal weakness or sensory changes    All other systems reviewed and are negative      PAST MEDICAL HISTORY:  No past medical history on file.    PAST SURGICAL HISTORY:  No past surgical history on file.    CURRENT MEDICATIONS:    buprenorphine-naloxone (SUBOXONE) 8-2 MG SUBL sublingual tablet  cloNIDine (CATAPRES) 0.1 MG tablet  mirtazapine (REMERON) 15 MG tablet  naloxone (NARCAN) 4 MG/0.1ML nasal spray  OLANZapine (ZYPREXA) 5 MG tablet  ondansetron (ZOFRAN ODT) 4 MG ODT tab  risperiDONE (RISPERDAL) 2 MG  tablet        ALLERGIES:  No Known Allergies    FAMILY HISTORY:  No family history on file.    SOCIAL HISTORY:   Social History     Socioeconomic History     Marital status: Single   Tobacco Use     Smoking status: Never     Smokeless tobacco: Never   Substance and Sexual Activity     Alcohol use: Yes     Drug use: No       VITALS:  Patient Vitals for the past 24 hrs:   BP Temp Temp src Pulse Resp SpO2   06/10/23 2129 -- 98.8  F (37.1  C) Oral -- -- --   06/10/23 2126 136/67 -- -- 63 18 98 %       PHYSICAL EXAM    VITAL SIGNS: /67   Pulse 63   Temp 98.8  F (37.1  C) (Oral)   Resp 18   SpO2 98%    Constitutional:  Well developed, Well nourished,  HENT:  Normocephalic, Atraumatic, Oropharynx moist, Nose normal.   Neck:  Normal range of motion, Supple, No stridor.   Eyes:  EOMI, Conjunctiva normal, No discharge.   Respiratory:  Breathing comfortably, No respiratory distress,    Cardiovascular:  Normal pulses   Musculoskeletal:  No edema or cyanosis, no deformities. No ankle tenderness or deformities. Able to ambulate.  Integument:  Dry, No erythema, No rash.  Neurologic:  Alert & oriented x 3, No obvious focal deficits noted.           I, Jevon Sanchez, am serving as a scribe to document services personally performed by Dr. Almazan based on my observation and the provider's statements to me. I, Kemal Almazan, DO attest that Jevon Sanchez is acting in a scribe capacity, has observed my performance of the services and has documented them in accordance with my direction.    Kemal Almazan DO  Emergency Medicine  [facility] EMERGENCY DEPARTMENT  [address] [place] [id number] [phone]  Dept: [phone]     Kemal Almazan MD  06/10/23 0525

## 2023-06-18 ENCOUNTER — HOSPITAL ENCOUNTER (EMERGENCY)
Facility: CLINIC | Age: 38
End: 2023-06-18

## 2023-06-18 ENCOUNTER — MEDICAL CORRESPONDENCE (OUTPATIENT)
Dept: EMERGENCY MEDICINE | Facility: CLINIC | Age: 38
End: 2023-06-18
Payer: COMMERCIAL

## 2023-06-18 ENCOUNTER — HOSPITAL ENCOUNTER (EMERGENCY)
Facility: CLINIC | Age: 38
Discharge: NURSING, REHAB OR LONG-TERM CARE | End: 2023-06-21
Attending: EMERGENCY MEDICINE | Admitting: EMERGENCY MEDICINE
Payer: COMMERCIAL

## 2023-06-18 ENCOUNTER — TELEPHONE (OUTPATIENT)
Dept: BEHAVIORAL HEALTH | Facility: CLINIC | Age: 38
End: 2023-06-18
Payer: COMMERCIAL

## 2023-06-18 DIAGNOSIS — F15.91 HISTORY OF METHAMPHETAMINE USE: ICD-10-CM

## 2023-06-18 DIAGNOSIS — F29 PSYCHOSIS, UNSPECIFIED PSYCHOSIS TYPE (H): ICD-10-CM

## 2023-06-18 LAB
ALBUMIN SERPL BCG-MCNC: 4.2 G/DL (ref 3.5–5.2)
ALP SERPL-CCNC: 71 U/L (ref 40–129)
ALT SERPL W P-5'-P-CCNC: 24 U/L (ref 0–70)
ANION GAP SERPL CALCULATED.3IONS-SCNC: 11 MMOL/L (ref 7–15)
AST SERPL W P-5'-P-CCNC: 31 U/L (ref 0–45)
BASOPHILS # BLD AUTO: 0.1 10E3/UL (ref 0–0.2)
BASOPHILS NFR BLD AUTO: 1 %
BILIRUB SERPL-MCNC: 1.2 MG/DL
BUN SERPL-MCNC: 16.6 MG/DL (ref 6–20)
CALCIUM SERPL-MCNC: 9.6 MG/DL (ref 8.6–10)
CHLORIDE SERPL-SCNC: 104 MMOL/L (ref 98–107)
CREAT SERPL-MCNC: 0.93 MG/DL (ref 0.67–1.17)
DEPRECATED HCO3 PLAS-SCNC: 23 MMOL/L (ref 22–29)
EOSINOPHIL # BLD AUTO: 0.3 10E3/UL (ref 0–0.7)
EOSINOPHIL NFR BLD AUTO: 3 %
ERYTHROCYTE [DISTWIDTH] IN BLOOD BY AUTOMATED COUNT: 12.7 % (ref 10–15)
GFR SERPL CREATININE-BSD FRML MDRD: >90 ML/MIN/1.73M2
GLUCOSE SERPL-MCNC: 93 MG/DL (ref 70–99)
HCT VFR BLD AUTO: 46.9 % (ref 40–53)
HGB BLD-MCNC: 15.3 G/DL (ref 13.3–17.7)
IMM GRANULOCYTES # BLD: 0 10E3/UL
IMM GRANULOCYTES NFR BLD: 0 %
LYMPHOCYTES # BLD AUTO: 2.5 10E3/UL (ref 0.8–5.3)
LYMPHOCYTES NFR BLD AUTO: 26 %
MCH RBC QN AUTO: 28.8 PG (ref 26.5–33)
MCHC RBC AUTO-ENTMCNC: 32.6 G/DL (ref 31.5–36.5)
MCV RBC AUTO: 88 FL (ref 78–100)
MONOCYTES # BLD AUTO: 0.7 10E3/UL (ref 0–1.3)
MONOCYTES NFR BLD AUTO: 8 %
NEUTROPHILS # BLD AUTO: 5.8 10E3/UL (ref 1.6–8.3)
NEUTROPHILS NFR BLD AUTO: 62 %
NRBC # BLD AUTO: 0 10E3/UL
NRBC BLD AUTO-RTO: 0 /100
PLATELET # BLD AUTO: 210 10E3/UL (ref 150–450)
POTASSIUM SERPL-SCNC: 4.4 MMOL/L (ref 3.4–5.3)
PROT SERPL-MCNC: 6.8 G/DL (ref 6.4–8.3)
RBC # BLD AUTO: 5.32 10E6/UL (ref 4.4–5.9)
SARS-COV-2 RNA RESP QL NAA+PROBE: NEGATIVE
SODIUM SERPL-SCNC: 138 MMOL/L (ref 136–145)
WBC # BLD AUTO: 9.4 10E3/UL (ref 4–11)

## 2023-06-18 PROCEDURE — 87635 SARS-COV-2 COVID-19 AMP PRB: CPT | Performed by: EMERGENCY MEDICINE

## 2023-06-18 PROCEDURE — 80053 COMPREHEN METABOLIC PANEL: CPT | Performed by: EMERGENCY MEDICINE

## 2023-06-18 PROCEDURE — 90791 PSYCH DIAGNOSTIC EVALUATION: CPT

## 2023-06-18 PROCEDURE — 85025 COMPLETE CBC W/AUTO DIFF WBC: CPT | Performed by: EMERGENCY MEDICINE

## 2023-06-18 PROCEDURE — 250N000013 HC RX MED GY IP 250 OP 250 PS 637: Performed by: EMERGENCY MEDICINE

## 2023-06-18 PROCEDURE — 99285 EMERGENCY DEPT VISIT HI MDM: CPT | Mod: 25 | Performed by: EMERGENCY MEDICINE

## 2023-06-18 PROCEDURE — 36415 COLL VENOUS BLD VENIPUNCTURE: CPT | Performed by: EMERGENCY MEDICINE

## 2023-06-18 PROCEDURE — 99285 EMERGENCY DEPT VISIT HI MDM: CPT | Performed by: EMERGENCY MEDICINE

## 2023-06-18 RX ORDER — OLANZAPINE 10 MG/1
10 TABLET, ORALLY DISINTEGRATING ORAL ONCE
Status: COMPLETED | OUTPATIENT
Start: 2023-06-18 | End: 2023-06-18

## 2023-06-18 RX ORDER — OLANZAPINE 10 MG/1
10 TABLET, ORALLY DISINTEGRATING ORAL AT BEDTIME
Status: DISCONTINUED | OUTPATIENT
Start: 2023-06-18 | End: 2023-06-21 | Stop reason: HOSPADM

## 2023-06-18 RX ADMIN — OLANZAPINE 10 MG: 10 TABLET, ORALLY DISINTEGRATING ORAL at 05:23

## 2023-06-18 RX ADMIN — OLANZAPINE 10 MG: 10 TABLET, ORALLY DISINTEGRATING ORAL at 21:58

## 2023-06-18 ASSESSMENT — ACTIVITIES OF DAILY LIVING (ADL)
ADLS_ACUITY_SCORE: 35

## 2023-06-18 ASSESSMENT — COLUMBIA-SUICIDE SEVERITY RATING SCALE - C-SSRS
TOTAL  NUMBER OF INTERRUPTED ATTEMPTS LIFETIME: NO
1. HAVE YOU WISHED YOU WERE DEAD OR WISHED YOU COULD GO TO SLEEP AND NOT WAKE UP?: NO
ATTEMPT LIFETIME: NO
6. HAVE YOU EVER DONE ANYTHING, STARTED TO DO ANYTHING, OR PREPARED TO DO ANYTHING TO END YOUR LIFE?: NO
2. HAVE YOU ACTUALLY HAD ANY THOUGHTS OF KILLING YOURSELF?: NO

## 2023-06-18 NOTE — PLAN OF CARE
Austin Ramirez  June 18, 2023  Plan of Care Hand-off Note     Patient Care Path: Inpatient Mental Health    Plan for Care:     Patient presents to the Emergency Department via police on a police hold. The [place] [employer] [place] with disorganized thoughts and speech. The patient currently presents with blunted affect, anxious mood (rubbing his hands and feet constantly),  struggling to express his thoughts and words, appear disorganized. He was able to identify limited information, such as his mother's contact information and being oriented to place.     Drug screen is negative for substances. Patient has prescribed medications, but historically does not want to take medications.   Patient has a hx of psychotic d/o, methamphetamine use d/o and alcohol use.      Critical Safety Issues: Patient has a hx of refusing to take prescription medication.     Overview:  This patient is a child/adolescent: No    This patient has additional special visitor precautions: No    Legal Status: Voluntary    Contacts:   Enriqueta [phone]    Psychiatry Consult:  Adult Psychiatry Consult requested related to patient has not been taking psych medications for a while; current presentation is psychotic. . Psychiatry IP Consult Order Placed: Yes    Updated RN regarding plan of care.    JESSICA Carrington, LICSW, Psychotherapist.

## 2023-06-18 NOTE — ED NOTES
IP MH Referral Acuity Rating Score (RARS)    LMHP complete at referral to IP MH, with DEC; and, daily while awaiting IP MH placement. Call score to PPS.  CRITERIA SCORING   New 72 HH and Involuntary for IP MH (not adolescent) 0/1   Boarding over 24 hours 0/1   Vulnerable adult at least 55+ with multiple co morbidities; or, Patient age 11 or under 0/1   Suicide ideation without relief of precipitating factors 0/1   Current plan for suicide 0/1   Current plan for homicide 0/1   Imminent risk or actual attempt to seriously harm another without relief of factors precipitating the attempt 0/1   Severe dysfunction in daily living (ex: complete neglect for self care, extreme disruption in vegetative function, extreme deterioration in social interactions) 1/1   Recent (last 2 weeks) or current physical aggression in the ED 0/1   Restraints or seclusion episode in ED 0/1   Verbal aggression, agitation, yelling, etc., while in the ED 0/1   Active psychosis with psychomotor agitation or catatonia 1/1   Need for constant or near constant redirection (from leaving, from others, etc).  0/1   Intrusive or disruptive behaviors 1/1   TOTAL Acuity Total Score: 3

## 2023-06-18 NOTE — TELEPHONE ENCOUNTER
S: [facility] Jaimie , DEC  Marnie calling at 3:25PM about a 37 year old/Male presenting with psychosis, disorganized thoughts.    B: Pt arrived via Police. Presenting problem, stressors: Pt presents with psychosis.  Pt unable to express thoughts and words.  Pt is disorganized.  Pt not able to answer many questions throughout the assessment.    Pt affect in ED: Blunted  Pt Dx: Psychotic disorder, Antisocial personality disorder, methamphetamine disorder  Previous IPMH hx? Yes: [facility].  Unknown when.  Pt denies SI   Hx of suicide attempt? No  Pt denies SIB  Pt denies HI   Pt denies hallucinations .   Pt RARS Score: 3    Hx of aggression/violence, sexual offenses, legal concerns, Epic care plan? describe: No  Current concerns for aggression this visit? No  Does pt have a history of Civil Commitment? No  Is Pt their own guardian? Yes    Pt is prescribed medication. Is patient medication compliant? No  Pt denies OP services   CD concerns: Pt has a Hx of ETOH abuse, meth abuse, and meth induced psychosis.  UTOX negative  Acute or chronic medical concerns: No  Does Pt present with specific needs, assistive devices, or exclusionary criteria? None      Pt is ambulatory  Pt is able to perform ADLs independently      A: Pt to be reviewed for [facility] admission. Pt is Voluntary  Preferred placement: [facility]    COVID:Not yet ordered, Intake to request lab   Utox: Not ordered, intake to request lab    CMP: N/A  CBC: N/A  HCG: N/A    R: Patient cleared and ready for behavioral bed placement: Yes  Pt placed on [facility] worklist? Yes

## 2023-06-18 NOTE — PHARMACY-ADMISSION MEDICATION HISTORY
Pharmacy Intern Admission Medication History    Admission medication history is complete. The information provided in this note is only as accurate as the sources available at the time of the update.    Medication reconciliation/reorder completed by provider prior to medication history? No    Information Source(s): Patient and CareEverywhere/SureScripts via in-person    Pertinent Information:   - Per patient, he has not taken any prescription or over the counter medications, herbal supplements, eye drops, suppositories, or topical creams in over a month.   - Per patient, he reports that the pharmacy he goes to is the [facility] on [address] however that pharmacy has been closed for about a year.     Changes made to PTA medication list:    Added: None    Deleted: None    Changed: None    Allergies reviewed with patient and updates made in EHR: yes    Medication History Completed By: Skye Dolan 6/18/2023 4:14 PM    Prior to Admission medications    Not on File

## 2023-06-18 NOTE — ED TRIAGE NOTES
Pt [place] on [place] PD.  Pt not making sense, unable to answer simple questions.  Pt placed on PD hold.     Triage Assessment     Row Name 06/18/23 0342       Triage Assessment (Adult)    Airway WDL WDL       Respiratory WDL    Respiratory WDL WDL       Skin Circulation/Temperature WDL    Skin Circulation/Temperature WDL WDL       Cardiac WDL    Cardiac WDL WDL       Peripheral/Neurovascular WDL    Peripheral Neurovascular WDL WDL       Cognitive/Neuro/Behavioral WDL    Cognitive/Neuro/Behavioral WDL X;orientation;level of consciousness;mood/behavior    Level of Consciousness confused    Orientation place;time;situation    Mood/Behavior restless;cooperative       [place] Coma Scale    Best Eye Response 4-->(E4) spontaneous    Best Motor Response 6-->(M6) obeys commands    Best Verbal Response 4-->(V4) confused    [place] Coma Scale Score 14
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
## 2023-06-18 NOTE — ED PROVIDER NOTES
"ED Provider Note  [facility]      History     Chief Complaint   Patient presents with     Mental Health Problem     [place] on [place] system by transit PD.  Pt not making sense, unable to answer simple questions.  Pt placed on PD hold.     HPI  Austin Ramirez is a 37 year old male with a history of homelessness, substance-induced psychosis, methamphetamine use, who presents to the ER because [place] not making [place].  Here, the patient is awake, appears alert, is fidgety, but has difficulty answering questions.  He answers, \"yes,\" to most questions.  He does endorse using meth tonight, states he smokes it.  He denies any injuries or any acute complaints.  He told me he walked here when asked how he got here.  He does not endorse any suicidal ideation.    Past Medical History  History reviewed. No pertinent past medical history.  History reviewed. No pertinent surgical history.  No current outpatient medications on file.    No Known Allergies  Family History  History reviewed. No pertinent family history.  Social History   Social History     Tobacco Use     Smoking status: Never     Smokeless tobacco: Never   Substance Use Topics     Alcohol use: Never     Drug use: Never         A medically appropriate review of systems was performed with pertinent positives and negatives noted in the HPI, and all other systems negative.    Physical Exam   BP: (!) 153/91  Pulse: 94  Temp: 98.4  F (36.9  C)  Resp: 18  Height: 188 cm (6' 2\")  Weight: 102.1 kg (225 lb)  SpO2: 100 %  Physical Exam  Constitutional:       General: He is not in acute distress.     Appearance: Normal appearance. He is not toxic-appearing.   HENT:      Head: Atraumatic.   Eyes:      General: No scleral icterus.     Conjunctiva/sclera: Conjunctivae normal.   Cardiovascular:      Rate and Rhythm: Normal rate.      Heart sounds: Normal heart sounds.   Pulmonary:      Effort: Pulmonary " "effort is normal. No respiratory distress.      Breath sounds: Normal breath sounds.   Abdominal:      Palpations: Abdomen is soft.      Tenderness: There is no abdominal tenderness.   Musculoskeletal:         General: No deformity.      Cervical back: Neck supple.   Skin:     General: Skin is warm.   Neurological:      Mental Status: He is alert.      Comments: Alert, moving all 4 extremities, answers questions - but typically answers, \"yeah,\" to most questions and seems to have a blank look in his eyes           ED Course, Procedures, & Data      Procedures                     No results found for any visits on 06/18/23.  Medications   OLANZapine zydis (zyPREXA) ODT tab 10 mg (10 mg Oral $Given 6/18/23 0523)     Labs Ordered and Resulted from Time of ED Arrival to Time of ED Departure - No data to display  No orders to display          Critical care was not performed.     Medical Decision Making  The patient's presentation was of high complexity (a chronic illness severe exacerbation, progression, or side effect of treatment).    The patient's evaluation involved:  review of external note(s) from 3+ sources (old notes)    The patient's management necessitated moderate risk (prescription drug management including medications given in the ED).      Assessment & Plan    The patient presents with an extremely odd affect, blank stare, not able to answer many questions.  He is moving all 4 extremities though.  This does not seem like a neurologic abnormality.  I did review his chart, see he is got a history of drug-induced psychosis, history of methamphetamine abuse.  He is very fidgety here, I do still suspect that he has been using meth, and that is contributing to his presentation.  He was given a dose of Zyprexa here.  We will allow him to rest for a while, then if he does not clear he will need a DEC assessment.  He is signed out to the oncoming provider at shift change pending repeat assessment.    Dictation " Disclaimer: Some of this Note has been completed with voice-recognition dictation software. Although errors are generally corrected real-time, there is the potential for a rare error to be present in the completed chart.      I have reviewed the nursing notes. I have reviewed the findings, diagnosis, plan and need for follow up with the patient.    New Prescriptions    No medications on file       Final diagnoses:   None       Soledad Calles  [facility] EMERGENCY DEPARTMENT  6/18/2023     Soledad Calles MD  06/18/23 0706
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
## 2023-06-19 ENCOUNTER — TELEPHONE (OUTPATIENT)
Dept: BEHAVIORAL HEALTH | Facility: CLINIC | Age: 38
End: 2023-06-19
Payer: COMMERCIAL

## 2023-06-19 PROCEDURE — 250N000013 HC RX MED GY IP 250 OP 250 PS 637

## 2023-06-19 PROCEDURE — 99245 OFF/OP CONSLTJ NEW/EST HI 55: CPT

## 2023-06-19 PROCEDURE — 250N000013 HC RX MED GY IP 250 OP 250 PS 637: Performed by: EMERGENCY MEDICINE

## 2023-06-19 RX ORDER — OLANZAPINE 5 MG/1
5 TABLET, ORALLY DISINTEGRATING ORAL DAILY
Status: DISCONTINUED | OUTPATIENT
Start: 2023-06-19 | End: 2023-06-21 | Stop reason: HOSPADM

## 2023-06-19 RX ADMIN — OLANZAPINE 5 MG: 5 TABLET, ORALLY DISINTEGRATING ORAL at 09:24

## 2023-06-19 ASSESSMENT — ACTIVITIES OF DAILY LIVING (ADL)
ADLS_ACUITY_SCORE: 35

## 2023-06-19 NOTE — ED PROVIDER NOTES
[facility] ED Mental Health Handoff Note:       Brief HPI:  This is a 37 year old male signed out to me.  See initial ED Provider note for full details of the presentation. Interval history is pertinent for ongoing ED boarding.    Home meds reviewed and ordered/administered: Yes    Medically stable for inpatient mental health admission: Yes.    Evaluated by mental health: Yes. The recommendation is for inpatient mental health treatment. Bed search in process    Safety concerns: At the time I received sign out, there were no safety concerns.    Hold Status:  Active Orders   N/A       Exam:   Patient Vitals for the past 24 hrs:   BP Temp Temp src Pulse SpO2   06/19/23 1142 130/85 98.3  F (36.8  C) Oral 103 99 %   06/19/23 0651 (!) 148/78 97.8  F (36.6  C) Oral 63 100 %       ED Course:    Medications   OLANZapine zydis (zyPREXA) ODT tab 10 mg (10 mg Oral $Given 6/18/23 2158)   OLANZapine zydis (zyPREXA) ODT tab 5 mg (5 mg Oral $Given 6/19/23 0924)   OLANZapine zydis (zyPREXA) ODT tab 10 mg (10 mg Oral $Given 6/18/23 0523)            There were no significant events during my shift.    Impression:    ICD-10-CM    1. Psychosis, unspecified psychosis type (H)  F29       2. History of methamphetamine use  F15.91           Plan:    1. Awaiting mental health evaluation/recommendations.      RESULTS:   Results for orders placed or performed during the hospital encounter of 06/18/23 (from the past 24 hour(s))   Psychiatry IP Consult: medication recommendation?; Consultant may enter orders: Yes; Requesting provider? ED Provider     Status: None ()    Collection Time: 06/18/23  3:36 PM    Narrative    Rema Pan APRN CNP     6/19/2023 11:12 AM    Austin Ramirez MRN# [id number]   Age: 37 year old YOB: 1985   Date of Admission to ED: 6/18/2023    In person visit Details:     Patient was assessed and interviewed face-to-face in person with   this writer a. Patient was observed to be able to participate  "in   the assessment as evidenced by verbal consent. Assessment methods   included conducting a formal interview with patient, review of   medical records, collaboration with medical staff, and obtaining   relevant collateral information from family and community   providers when available.        Reason for Consult:   Psychiatry consult was requested by ED provider for medication   management while patient was in the emergency room.    Writer met patient in his room face-to-face, patient was alert to   himself confused to time place, and is the reason he is in the   emergency room.  Patient said\" I am in a hotel.\"  When the writer   told him he is in the emergency room patient kind of surprised   that why he is in the emergency room..  Patient was very   disorganized and confused during assessment and interview,   patient denied any suicidal ideation or homicidal ideation or   self injury behavior.  Patient said he has been using   methamphetamine and tobacco every day denied using alcohol, or   opiate.  As he said \" I already use upper.\"  When the writer   asked him to explain what does that mean upper \" I use   methamphetamine or other stimulant.\"  When the writer asking him what psychiatry can do for him,   patient said \" I wanted to prescribe for me Adderall,   amphetamine.\"     I have reviewed the nursing notes. I have reviewed the findings,   diagnosis, plan and need for follow up with the patient.         HPI:   Per ED provider note Austin Ramirez is a 37 year old male with a   history of homelessness, substance-induced psychosis,   methamphetamine use, who presents to the ER because he [place]   [place] not making sense.    Here, the patient is awake, appears alert, is fidgety, but has   difficulty answering questions.  He answers, \"yes,\" to most   questions.  He does endorse using meth tonight, states he smokes   it.  He denies any injuries or any acute complaints.  He " told me   he walked here when asked how he got here.  He does not endorse   any suicidal ideation.        Pt has equired locked seclusion or restraints in the past 24   hours to maintain safety, please refer to RN documentation for   further details.  Substance use does appear to be playing a contributing role in   the patient's presentation  Brief Therapeutic Intervention(s):   Provided active listening, unconditional positive regard, and   validation. Engaged in cognitive restructuring/ reframing, looked   at common cognitive distortions and challenged negative thoughts.   Engaged in guided discovery, explored patient's perspectives and   helped expand them through socratic dialogue. Provided positive   reinforcement for progress towards goals, gains in knowledge, and   application of skills previously taught.  Engaged in social   skills training. Explored and identified early warning signs to   anger        Past Psychiatric History:     Patient has a hx of mental health diagnosis including Major   Depressive D/O, Anti-Social Personality Disorder and Generalized   Anxiety D/O. Patient has a hx of multiple visits to the ED, and   mental health admissions to [facility]. Pt has a hx of   command hallucinations.      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 Hx of   substance abuse treatment at [facility] in 9/2022        Substance Use and History:     Severe stimulant use disorder  I have reviewed this patient's current medications  Current Facility-Administered Medications   Medication     OLANZapine zydis (zyPREXA) ODT tab 10 mg     OLANZapine zydis (zyPREXA) ODT tab 5 mg     No current outpatient medications on file.           Past Medical History:   PAST MEDICAL HISTORY: History reviewed. No pertinent past medical   history.    PAST SURGICAL HISTORY: History reviewed. No pertinent surgical   history.            Allergies:   No Known  Allergies          Medications:   I have reviewed this patient's current medications           Family History:   FAMILY HISTORY: History reviewed. No pertinent family history.        Social History:   SOCIAL HISTORY:   Social History     Tobacco Use     Smoking status: Never     Smokeless tobacco: Never   Vaping Use     Vaping status: Not on file   Substance Use Topics     Alcohol use: Never            PTA Medications:   (Not in a hospital admission)         Allergies:   No Known Allergies       Labs:     Recent Results (from the past 48 hour(s))   Asymptomatic COVID-19 Virus (Coronavirus) by PCR Nasopharyngeal    Collection Time: 06/18/23  5:02 PM    Specimen: Nasopharyngeal; Swab   Result Value Ref Range    SARS CoV2 PCR Negative Negative   Comprehensive metabolic panel    Collection Time: 06/18/23  5:09 PM   Result Value Ref Range    Sodium 138 136 - 145 mmol/L    Potassium 4.4 3.4 - 5.3 mmol/L    Chloride 104 98 - 107 mmol/L    Carbon Dioxide (CO2) 23 22 - 29 mmol/L    Anion Gap 11 7 - 15 mmol/L    Urea Nitrogen 16.6 6.0 - 20.0 mg/dL    Creatinine 0.93 0.67 - 1.17 mg/dL    Calcium 9.6 8.6 - 10.0 mg/dL    Glucose 93 70 - 99 mg/dL    Alkaline Phosphatase 71 40 - 129 U/L    AST 31 0 - 45 U/L    ALT 24 0 - 70 U/L    Protein Total 6.8 6.4 - 8.3 g/dL    Albumin 4.2 3.5 - 5.2 g/dL    Bilirubin Total 1.2 <=1.2 mg/dL    GFR Estimate >90 >60 mL/min/1.73m2   CBC with platelets and differential    Collection Time: 06/18/23  5:09 PM   Result Value Ref Range    WBC Count 9.4 4.0 - 11.0 10e3/uL    RBC Count 5.32 4.40 - 5.90 10e6/uL    Hemoglobin 15.3 13.3 - 17.7 g/dL    Hematocrit 46.9 40.0 - 53.0 %    MCV 88 78 - 100 fL    MCH 28.8 26.5 - 33.0 pg    MCHC 32.6 31.5 - 36.5 g/dL    RDW 12.7 10.0 - 15.0 %    Platelet Count 210 150 - 450 10e3/uL    % Neutrophils 62 %    % Lymphocytes 26 %    % Monocytes 8 %    % Eosinophils 3 %    % Basophils 1 %    % Immature Granulocytes 0 %    NRBCs per 100 WBC 0 <1 /100    Absolute Neutrophils  "5.8 1.6 - 8.3 10e3/uL    Absolute Lymphocytes 2.5 0.8 - 5.3 10e3/uL    Absolute Monocytes 0.7 0.0 - 1.3 10e3/uL    Absolute Eosinophils 0.3 0.0 - 0.7 10e3/uL    Absolute Basophils 0.1 0.0 - 0.2 10e3/uL    Absolute Immature Granulocytes 0.0 <=0.4 10e3/uL    Absolute NRBCs 0.0 10e3/uL          Physical and Psychiatric Examination:     BP (!) 148/78   Pulse 63   Temp 97.8  F (36.6  C) (Oral)     Resp 18   Ht 1.88 m (6' 2\")   Wt 102.1 kg (225 lb)   SpO2 100%     BMI 28.89 kg/m    Weight is 225 lbs 0 oz  Body mass index is 28.89 kg/m .    Mental Status Exam:  Appearance: awake, alert  Attitude:  cooperative  Eye Contact:  poor   Mood:  anxious  Affect:  appropriate and in normal range  Speech:  clear, coherent  Language: fluent and intact in English  Psychomotor, Gait, Musculoskeletal:  no evidence of tardive   dyskinesia, dystonia, or tics  Thought Process:  disorganized and illogical  Associations:  no loose associations  Thought Content:  no evidence of suicidal ideation or homicidal   ideation and no visual hallucinations present currently patient   is very confused disorganized   Insight:  Poor  Judgement:  poor  Oriented to:    Attention Span and Concentration:  poor  Recent and Remote Memory:  poor  Fund of Knowledge:  low-normal         Diagnoses:      Psychosis, unspecified psychosis type (H)  History of methamphetamine use         Recommendations:     1. Pt displays the following risk factors that support IP   admission:  Very confused, disorganized  thought proces, poor   judgment severe dysfunction of daily living unable to contract   for safety. Pt is unable to engage in safety planning to mitigate   risk level in a non-secure setting. Lower levels of care have not   been successful in mitigating risk. Due to this IP is the least   restrictive option of care for pt. Pt should remain in IP until   deemed safe to return to the community and engage in [facility]   supports    - Continue to recommend inpatient " psychiatric hospitalizations   for further stabilization   2.  Start Zyprexa 10 mg at bedtime, 5 mg daily  3.  Chemical dependency assessment  4.  Consult psychiatry as needed  4.   Refer to psychiatric provider for medication management.   treatment per ED team    - Consulted with [facility] Cherrie FLOOD, ED physician, patient's ED   Charo LEVI regarding this case.    Please call [facility]/DEC at [phone] if you have follow-up   questions or wish to place another consult.  Rema Pan, Psychiatric Nurse practitioner    Attestation:  Time with:  Patient: 20  minutes  Treatment Team: 25Minutes  Chart Review: 20 minutes    Total time spent was 65  minutes. Over 50% of times was spent   counseling and coordination of care.    I, Rema Pan, CNP, APRN, Psychiatric Nurse Practitioner have   personally performed an examination of this patient.  I have   edited the note to reflect all relevant changes.  I have   discussed this patient with the care team June 19, 2023  I have   reviewed all vitals and laboratory findings.    Disclaimer: This note consists of symbols derived from   keyboarding,         Asymptomatic COVID-19 Virus (Coronavirus) by PCR Nasopharyngeal     Status: Normal    Collection Time: 06/18/23  5:02 PM    Specimen: Nasopharyngeal; Swab   Result Value Ref Range    SARS CoV2 PCR Negative Negative    Narrative    Testing was performed using the Xpert Xpress SARS-CoV-2 Assay on the Cepheid Gene-Xpert Instrument Systems. Additional information about this Emergency Use Authorization (EUA) assay can be found via the Lab Guide. This test should be ordered for the detection of SARS-CoV-2 in individuals who meet SARS-CoV-2 clinical and/or epidemiological criteria as well as from individuals without symptoms or other reasons to suspect COVID-19. Test performance for asymptomatic patients has only been established in anterior nasal swab specimens. This test is for in vitro diagnostic use under the FDA EUA for laboratories  certified under CLIA to perform high complexity testing. This test has not been FDA cleared or approved. A negative result does not rule out the presence of PCR inhibitors in the specimen or target RNA concentration below the limit of detection for the assay. The possibility of a false negative should be considered if the patient's recent exposure or clinical presentation suggests COVID-19. This test was validated by the [facility]. This laboratory is certified under the Clinical Laboratory Improvement Amendments (CLIA) as qualified to perform high complexity laboratory testing.     CBC with platelets differential     Status: None    Collection Time: 06/18/23  5:09 PM    Narrative    The following orders were created for panel order CBC with platelets differential.  Procedure                               Abnormality         Status                     ---------                               -----------         ------                     CBC with platelets and d...[[id number]]                      Final result                 Please view results for these tests on the individual orders.   Comprehensive metabolic panel     Status: Normal    Collection Time: 06/18/23  5:09 PM   Result Value Ref Range    Sodium 138 136 - 145 mmol/L    Potassium 4.4 3.4 - 5.3 mmol/L    Chloride 104 98 - 107 mmol/L    Carbon Dioxide (CO2) 23 22 - 29 mmol/L    Anion Gap 11 7 - 15 mmol/L    Urea Nitrogen 16.6 6.0 - 20.0 mg/dL    Creatinine 0.93 0.67 - 1.17 mg/dL    Calcium 9.6 8.6 - 10.0 mg/dL    Glucose 93 70 - 99 mg/dL    Alkaline Phosphatase 71 40 - 129 U/L    AST 31 0 - 45 U/L    ALT 24 0 - 70 U/L    Protein Total 6.8 6.4 - 8.3 g/dL    Albumin 4.2 3.5 - 5.2 g/dL    Bilirubin Total 1.2 <=1.2 mg/dL    GFR Estimate >90 >60 mL/min/1.73m2   CBC with platelets and differential     Status: None    Collection Time: 06/18/23  5:09 PM   Result Value Ref Range    WBC Count 9.4 4.0 - 11.0 10e3/uL    RBC Count  5.32 4.40 - 5.90 10e6/uL    Hemoglobin 15.3 13.3 - 17.7 g/dL    Hematocrit 46.9 40.0 - 53.0 %    MCV 88 78 - 100 fL    MCH 28.8 26.5 - 33.0 pg    MCHC 32.6 31.5 - 36.5 g/dL    RDW 12.7 10.0 - 15.0 %    Platelet Count 210 150 - 450 10e3/uL    % Neutrophils 62 %    % Lymphocytes 26 %    % Monocytes 8 %    % Eosinophils 3 %    % Basophils 1 %    % Immature Granulocytes 0 %    NRBCs per 100 WBC 0 <1 /100    Absolute Neutrophils 5.8 1.6 - 8.3 10e3/uL    Absolute Lymphocytes 2.5 0.8 - 5.3 10e3/uL    Absolute Monocytes 0.7 0.0 - 1.3 10e3/uL    Absolute Eosinophils 0.3 0.0 - 0.7 10e3/uL    Absolute Basophils 0.1 0.0 - 0.2 10e3/uL    Absolute Immature Granulocytes 0.0 <=0.4 10e3/uL    Absolute NRBCs 0.0 10e3/uL   Chemical Dependency IP Consult     Status: None ()    Collection Time: 06/19/23  8:19 AM    Narrative    Frances Gray LADC     6/19/2023 12:19 PM  6/19/2023    CD consult acknowledged and closing.   Per EHR notes, pt is very disorganized and confused, disorganized   thought process, psychosis symptoms. Pt also likely coming down   from meth/stimulants per notes. Pt not appropriate for FILOMENA   evaluation.   Patients must be able to actively and appropriately participate   in the evaluation process including: maintaining cognitive focus   & recall throughout interview, providing responses that are   coherent and accurate, and managing emotional upset.  CD consult can be reordered when pt is agreeable for FILOMENA   treatment and appropriate to be interviewed.     [email or web address]  Direct phone: [phone]               MD Eliu Lomeli Dung Hoang, MD  06/19/23 4785

## 2023-06-19 NOTE — ED NOTES
IP MH Referral Acuity Rating Score (RARS)    LMHP complete at referral to IP MH, with DEC; and, daily while awaiting IP MH placement. Call score to PPS.  CRITERIA SCORING   New 70 HH and Involuntary for IP MH (not adolescent) 0/1   Boarding over 24 hours 1/1   Vulnerable adult at least 55+ with multiple co morbidities; or, Patient age 11 or under 0/1   Suicide ideation without relief of precipitating factors 1/1   Current plan for suicide 1/1   Current plan for homicide 0/1   Imminent risk or actual attempt to seriously harm another without relief of factors precipitating the attempt 0/1   Severe dysfunction in daily living (ex: complete neglect for self care, extreme disruption in vegetative function, extreme deterioration in social interactions) 1/1   Recent (last 2 weeks) or current physical aggression in the ED 0/1   Restraints or seclusion episode in ED 0/1   Verbal aggression, agitation, yelling, etc., while in the ED 0/1   Active psychosis with psychomotor agitation or catatonia 1/1   Need for constant or near constant redirection (from leaving, from others, etc).  0/1   Intrusive or disruptive behaviors 1/1   TOTAL Acuity Total Score: 6

## 2023-06-19 NOTE — ED NOTES
I received report on this patient and will take over acre at 11:40am. He denies any SDI/HI denies any pain. He is having anxiety and I have to contact MD if they want to continue hold.Observed with even gait and balance.Patient refused shower has drink fluids. Has no allergies.Verbal contract for safety while on unit went over reasons.At this time 12 hour hold will  and MD good with that if patient decides  to leave on his own is ok.Patient eating lunch eating well 80% ate very fast.patient  still very hyper active yet.Patient resting in lounge eyes closed chest rise and fall observed. 12 Hold has been dropped per MD he can be voluntary now.

## 2023-06-19 NOTE — PROGRESS NOTES
Triage & Transition Services, Extended Care     Austin Ramirez  June 19, 2023    Delroy is followed related to Long wait time for admission. Please see initial DEC Crisis Assessment completed for complete assessment information. Medical record is reviewed.  While patient is in the ED, care team is working towards Learn and Demonstrate at Least One Skill Focused on Crisis Stabilization.     Pt reported his current level of depression at a 7.  He stated that it has been at this level for 5 years.  Pt stated that he is having current suicidal thoughts with a plan to shoot himself.  He stated that he has access to a gun.  Pt reported auditory hallucinations.  He stated that the voices are telling him to kill himself.  He denied thoughts of harming others.  Pt reported that he has some anxiety.  He stated that he needs some rest.  He then requested to leave the session and stood up to return to the unit.    There are significant status changes. Pt denied suicidal thoughts yesterday, but is reporting suicidal thoughts with plan and intent today.  Also reporting command hallucination to kill himself.    Pt presented as anxious and restless.  He answered yes almost immediately to every question asked.  It is noted that the pt immediately started our conversation by telling me that he remembered me from being his therapist at [facility].    Pt met with psychiatric provider, Rema Pan, this morning and was prescribed Zyprexa.  Pt has not yet received this medication.    Plan:  Inpatient Mental Health: Pt presents as disorganized and has psychotic symptoms.  He reported voices telling him to kill himself.  Pt reported suicidal thoughts with plan and intent.  Inpatient care is necessary for safety and stabilization.    Plan for Care reviewed with Assigned Medical Provider? Yes. Provider, Dr. Nowak, response: Agreement    Extended Care will follow and meet with patient/family/care team as able or requested.     Cherrie  VIVIEN Geller LP  [facility], [facility]   [phone]

## 2023-06-19 NOTE — TELEPHONE ENCOUNTER
0238 Bed Search Update:    [facility]: at capacity  [facility]: at capacity per website  [facility]: at capacity per website  [facility]: at capacity per unit staff  [facility]: at capacity per website  [facility]: at capacity per website  [facility]: at capacity per website  [facility]: at capacity per website  [facility]: at capacity per website    Remains on wait list.

## 2023-06-19 NOTE — CONSULTS
"  Austin Ramirez MRN# [id number]   Age: 37 year old YOB: 1985   Date of Admission to ED: 6/18/2023    In person visit Details:     Patient was assessed and interviewed face-to-face in person with this writer kristen. Patient was observed to be able to participate in the assessment as evidenced by verbal consent. Assessment methods included conducting a formal interview with patient, review of medical records, collaboration with medical staff, and obtaining relevant collateral information from family and community providers when available.        Reason for Consult:   Psychiatry consult was requested by ED provider for medication management while patient was in the emergency room.    Writer met patient in his room face-to-face, patient was alert to himself confused to time place, and is the reason he is in the emergency room.  Patient said\" I am in a hotel.\"  When the writer told him he is in the emergency room patient kind of surprised that why he is in the emergency room..  Patient was very disorganized and confused during assessment and interview, patient denied any suicidal ideation or homicidal ideation or self injury behavior.  Patient said he has been using methamphetamine and tobacco every day denied using alcohol, or opiate.  As he said \" I already use upper.\"  When the writer asked him to explain what does that mean upper \" I use methamphetamine or other stimulant.\"  When the writer asking him what psychiatry can do for him, patient said \" I wanted to prescribe for me Adderall, amphetamine.\"     I have reviewed the nursing notes. I have reviewed the findings, diagnosis, plan and need for follow up with the patient.         HPI:   Per ED provider note Austin Ramirez is a 37 year old male with a history of homelessness, substance-induced psychosis, methamphetamine use, who presents to the ER [place] not making sense.  Here, the patient is awake, " "appears alert, is fidgety, but has difficulty answering questions.  He answers, \"yes,\" to most questions.  He does endorse using meth tonight, states he smokes it.  He denies any injuries or any acute complaints.  He told me he walked here when asked how he got here.  He does not endorse any suicidal ideation.        Pt has equired locked seclusion or restraints in the past 24 hours to maintain safety, please refer to RN documentation for further details.  Substance use does appear to be playing a contributing role in the patient's presentation  Brief Therapeutic Intervention(s):   Provided active listening, unconditional positive regard, and validation. Engaged in cognitive restructuring/ reframing, looked at common cognitive distortions and challenged negative thoughts. Engaged in guided discovery, explored patient's perspectives and helped expand them through socratic dialogue. Provided positive reinforcement for progress towards goals, gains in knowledge, and application of skills previously taught.  Engaged in social skills training. Explored and identified early warning signs to anger        Past Psychiatric History:     Patient has a hx of mental health diagnosis including Major Depressive D/O, Anti-Social Personality Disorder and Generalized Anxiety D/O. Patient has a hx of multiple visits to the ED, and mental health admissions to [facility]. Pt has a hx of command hallucinations.      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 Hx of substance abuse treatment at [facility] in 9/2022        Substance Use and History:     Severe stimulant use disorder  I have reviewed this patient's current medications  Current Facility-Administered Medications   Medication     OLANZapine zydis (zyPREXA) ODT tab 10 mg     OLANZapine zydis (zyPREXA) ODT tab 5 mg     No current outpatient medications on file.           Past Medical History:   PAST MEDICAL " HISTORY: History reviewed. No pertinent past medical history.    PAST SURGICAL HISTORY: History reviewed. No pertinent surgical history.            Allergies:   No Known Allergies          Medications:   I have reviewed this patient's current medications           Family History:   FAMILY HISTORY: History reviewed. No pertinent family history.        Social History:   SOCIAL HISTORY:   Social History     Tobacco Use     Smoking status: Never     Smokeless tobacco: Never   Vaping Use     Vaping status: Not on file   Substance Use Topics     Alcohol use: Never            PTA Medications:   (Not in a hospital admission)         Allergies:   No Known Allergies       Labs:     Recent Results (from the past 48 hour(s))   Asymptomatic COVID-19 Virus (Coronavirus) by PCR Nasopharyngeal    Collection Time: 06/18/23  5:02 PM    Specimen: Nasopharyngeal; Swab   Result Value Ref Range    SARS CoV2 PCR Negative Negative   Comprehensive metabolic panel    Collection Time: 06/18/23  5:09 PM   Result Value Ref Range    Sodium 138 136 - 145 mmol/L    Potassium 4.4 3.4 - 5.3 mmol/L    Chloride 104 98 - 107 mmol/L    Carbon Dioxide (CO2) 23 22 - 29 mmol/L    Anion Gap 11 7 - 15 mmol/L    Urea Nitrogen 16.6 6.0 - 20.0 mg/dL    Creatinine 0.93 0.67 - 1.17 mg/dL    Calcium 9.6 8.6 - 10.0 mg/dL    Glucose 93 70 - 99 mg/dL    Alkaline Phosphatase 71 40 - 129 U/L    AST 31 0 - 45 U/L    ALT 24 0 - 70 U/L    Protein Total 6.8 6.4 - 8.3 g/dL    Albumin 4.2 3.5 - 5.2 g/dL    Bilirubin Total 1.2 <=1.2 mg/dL    GFR Estimate >90 >60 mL/min/1.73m2   CBC with platelets and differential    Collection Time: 06/18/23  5:09 PM   Result Value Ref Range    WBC Count 9.4 4.0 - 11.0 10e3/uL    RBC Count 5.32 4.40 - 5.90 10e6/uL    Hemoglobin 15.3 13.3 - 17.7 g/dL    Hematocrit 46.9 40.0 - 53.0 %    MCV 88 78 - 100 fL    MCH 28.8 26.5 - 33.0 pg    MCHC 32.6 31.5 - 36.5 g/dL    RDW 12.7 10.0 - 15.0 %    Platelet Count 210 150 - 450 10e3/uL    % Neutrophils  "62 %    % Lymphocytes 26 %    % Monocytes 8 %    % Eosinophils 3 %    % Basophils 1 %    % Immature Granulocytes 0 %    NRBCs per 100 WBC 0 <1 /100    Absolute Neutrophils 5.8 1.6 - 8.3 10e3/uL    Absolute Lymphocytes 2.5 0.8 - 5.3 10e3/uL    Absolute Monocytes 0.7 0.0 - 1.3 10e3/uL    Absolute Eosinophils 0.3 0.0 - 0.7 10e3/uL    Absolute Basophils 0.1 0.0 - 0.2 10e3/uL    Absolute Immature Granulocytes 0.0 <=0.4 10e3/uL    Absolute NRBCs 0.0 10e3/uL          Physical and Psychiatric Examination:     BP (!) 148/78   Pulse 63   Temp 97.8  F (36.6  C) (Oral)   Resp 18   Ht 1.88 m (6' 2\")   Wt 102.1 kg (225 lb)   SpO2 100%   BMI 28.89 kg/m    Weight is 225 lbs 0 oz  Body mass index is 28.89 kg/m .    Mental Status Exam:  Appearance: awake, alert  Attitude:  cooperative  Eye Contact:  poor   Mood:  anxious  Affect:  appropriate and in normal range  Speech:  clear, coherent  Language: fluent and intact in English  Psychomotor, Gait, Musculoskeletal:  no evidence of tardive dyskinesia, dystonia, or tics  Thought Process:  disorganized and illogical  Associations:  no loose associations  Thought Content:  no evidence of suicidal ideation or homicidal ideation and no visual hallucinations present currently patient is very confused disorganized   Insight:  Poor  Judgement:  poor  Oriented to:    Attention Span and Concentration:  poor  Recent and Remote Memory:  poor  Fund of Knowledge:  low-normal         Diagnoses:      Psychosis, unspecified psychosis type (H)  History of methamphetamine use         Recommendations:     1. Pt displays the following risk factors that support IP admission:  Very confused, disorganized  thought proces, poor judgment severe dysfunction of daily living unable to contract for safety. Pt is unable to engage in safety planning to mitigate risk level in a non-secure setting. Lower levels of care have not been successful in mitigating risk. Due to this IP is the least restrictive option of " care for pt. Pt should remain in IP until deemed safe to return to the community and engage in OP MH supports    - Continue to recommend inpatient psychiatric hospitalizations for further stabilization   2.  Start Zyprexa 10 mg at bedtime, 5 mg daily  3.  Chemical dependency assessment  4.  Consult psychiatry as needed  4.   Refer to psychiatric provider for medication management.   treatment per ED team    - Consulted with [facility] Cherrie FLOOD, ED physician, patient's ED Charo LEVI regarding this case.    Please call [facility]/DEC at [phone] if you have follow-up questions or wish to place another consult.  Rema Pan, Psychiatric Nurse practitioner    Attestation:  Time with:  Patient: 20  minutes  Treatment Team: 25Minutes  Chart Review: 20 minutes    Total time spent was 65  minutes. Over 50% of times was spent counseling and coordination of care.    I, Rema Pan, CNP, APRN, Psychiatric Nurse Practitioner have personally performed an examination of this patient.  I have edited the note to reflect all relevant changes.  I have discussed this patient with the care team June 19, 2023  I have reviewed all vitals and laboratory findings.    Disclaimer: This note consists of symbols derived from keyboarding,

## 2023-06-19 NOTE — PROGRESS NOTES
"Triage & Transition Services, Extended Care      Client Name: Austin Ramirez \"Delroy\"   Date: June 19, 2023  Service Type:  Group Therapy  Site Location: [facility]  Facilitator: Kristine Cedeno     Topic:   WRAP Conversation Cards     Intervention:    Patient was sleeping in the lounge and did not participate in group.     Kristine Cedeno  Extended Care Coordinator  "

## 2023-06-19 NOTE — ED NOTES
"Patient was sleeping in the lounge. Writer approached the patient for assessment. Patient refused to have set of vital signs. Patient was not cooperative for assessment. Patient covered his face and said, \"I am good.\" Patient will be monitored closely.   "

## 2023-06-19 NOTE — PROGRESS NOTES
"Triage & Transition Services, Extended Care      Client Name: Austin Ramirez \"Delroy\"   Date: June 19, 2023  Service Type:  Group Therapy  Site Location: [facility]  Facilitator: Kristine Cedeno     Topic:   Collaging     Patient was sleeping in the lounge and did not participate in group.     Kristine Cedeno  Extended Care Coordinator  "

## 2023-06-19 NOTE — CONSULTS
6/19/2023    CD consult acknowledged and closing.   Per EHR notes, pt is very disorganized and confused, disorganized thought process, psychosis symptoms. Pt also likely coming down from meth/stimulants per notes. Pt not appropriate for FILOMENA evaluation.   Patients must be able to actively and appropriately participate in the evaluation process including: maintaining cognitive focus & recall throughout interview, providing responses that are coherent and accurate, and managing emotional upset.  CD consult can be reordered when pt is agreeable for FILOMENA treatment and appropriate to be interviewed.     Frances [facility]  [email or web address]  Direct phone: [phone]

## 2023-06-20 ENCOUNTER — TELEPHONE (OUTPATIENT)
Dept: BEHAVIORAL HEALTH | Facility: CLINIC | Age: 38
End: 2023-06-20
Payer: COMMERCIAL

## 2023-06-20 VITALS
OXYGEN SATURATION: 99 % | TEMPERATURE: 98.2 F | SYSTOLIC BLOOD PRESSURE: 130 MMHG | RESPIRATION RATE: 16 BRPM | WEIGHT: 225 LBS | BODY MASS INDEX: 28.88 KG/M2 | HEIGHT: 74 IN | DIASTOLIC BLOOD PRESSURE: 69 MMHG | HEART RATE: 83 BPM

## 2023-06-20 PROCEDURE — 250N000013 HC RX MED GY IP 250 OP 250 PS 637: Performed by: EMERGENCY MEDICINE

## 2023-06-20 PROCEDURE — 250N000013 HC RX MED GY IP 250 OP 250 PS 637

## 2023-06-20 RX ADMIN — OLANZAPINE 10 MG: 10 TABLET, ORALLY DISINTEGRATING ORAL at 21:11

## 2023-06-20 RX ADMIN — OLANZAPINE 5 MG: 5 TABLET, ORALLY DISINTEGRATING ORAL at 09:13

## 2023-06-20 ASSESSMENT — ACTIVITIES OF DAILY LIVING (ADL)
ADLS_ACUITY_SCORE: 35

## 2023-06-20 NOTE — PROGRESS NOTES
Triage & Transition Services, Extended Care    Client Name: Austin Ramirez    Date: June 20, 2023  Service Type:  Group Therapy     Topic:   Positive Poster    Intervention:    Group process: support, challenge, affirm, psycho-education.     Response:  Patient did not participate in group d/t sleeping.       Erika Dolan

## 2023-06-20 NOTE — ED NOTES
IP MH Referral Acuity Rating Score (RARS)    LMHP complete at referral to IP MH, with DEC; and, daily while awaiting IP MH placement. Call score to PPS.  CRITERIA SCORING   New 70 HH and Involuntary for IP MH (not adolescent) 0/1   Boarding over 24 hours 1/1   Vulnerable adult at least 55+ with multiple co morbidities; or, Patient age 11 or under 0/1   Suicide ideation without relief of precipitating factors 0/1   Current plan for suicide 0/1   Current plan for homicide 0/1   Imminent risk or actual attempt to seriously harm another without relief of factors precipitating the attempt 0/1   Severe dysfunction in daily living (ex: complete neglect for self care, extreme disruption in vegetative function, extreme deterioration in social interactions) 1/1   Recent (last 2 weeks) or current physical aggression in the ED 0/1   Restraints or seclusion episode in ED 0/1   Verbal aggression, agitation, yelling, etc., while in the ED 0/1   Active psychosis with psychomotor agitation or catatonia 1/1   Need for constant or near constant redirection (from leaving, from others, etc).  0/1   Intrusive or disruptive behaviors 0/1   TOTAL Acuity Total Score: 3

## 2023-06-20 NOTE — TELEPHONE ENCOUNTER
R; [place]  Access Inpatient Bed Call Log 6/20/23 @11:15 AM    Intake has called facilities that have not updated their bed status within the last 12 hours.            [facility] is posting 0 beds.             [facility] is posting 0 beds. [phone] Per call @8:22am, no available beds.  [facility] is posting 0 beds.              [facility] is posting 0 beds.    [facility] is posting 0 beds.            [facility] is posting 0 beds.                              [facility] is posting 0 beds.           [facility] is posting 0 beds.                      [facility] is posting 0 beds. [phone]                            Patient remains on work list waiting appropriate bed availability.

## 2023-06-20 NOTE — PROGRESS NOTES
Triage & Transition Services, Extended Care    Client Name: Austin Ramirez    Date: June 20, 2023  Service Type:  Group Therapy  Session Start Time:  5:30P    Session End Time: 5:45P  Session Length: 15min  Site Location: [facility]  Attendees: Patient and other group members  Facilitator: Erika Dolan     Topic:   Wellness    Intervention:    Group process: support, challenge, affirm, psycho-education.     Response:  Patient did participate in group. Behavior in group was appropriate. Patient shared that his week has not been going well.       Erika Dolan

## 2023-06-20 NOTE — ED NOTES
Patient refused his bed time medication, Zyprexa 10 mg. Patient was encouraged but declined. Patient will be monitored closely.

## 2023-06-20 NOTE — PROGRESS NOTES
"Triage & Transition Services, Extended Care     Austin Ramirez  June 20, 2023    Delroy is followed related to Long wait time for admission. Please see initial DEC Crisis Assessment completed for complete assessment information. Medical record is reviewed.     There are significant status changes.  Pt denied suicidal thoughts and command hallucinations.  In regard to reporting this yesterday, the pt stated, \"I just had a bad thought.\"    Pt reported that he is hearing voices.  He stated that the voices are \"saying crazy stuff.\"  He was not able to provide additional details, other than the voices were not telling him to hurt himself or others.  Pt denied depression.  He reported anxiety.  He denied having support people.  We discussed the importance of taking his prescribed medication.  He refused his evening Zyprexa yesterday because \"I was just tired.\"    Pt continues to present as disorganized.  He provides minimal responses and is quick to answer yes to questions.  Pt is voluntary for inpatient.    Plan:  Inpatient Mental Health: Pt continues to present as disorganized.  He reported psychotic symptoms.  He has not been medication compliant.  Inpatient care is needed for safety and stabilization.    Plan for Care reviewed with Assigned Medical Provider? Yes. Provider, Dr. Nowak, response: Agreement    Extended Care will follow and meet with patient/family/care team as able or requested.     Cherrie Geller LP  [facility], Extended Care   [phone]        "

## 2023-06-20 NOTE — ED NOTES
Patient sleeping in [facility] till 8:45am. Ate breakfast and completed vital signs.  Patient stated he is not suicidal or experiencing hallucinations visual and auditory.   Patient reports he does not feel like hurting himself or others.  Pt. Quiet and responding to requests.  Pt. Med compliant.      Pt. On and off sleeping and watching TV.   Pt. Ate lunch and relaxing in [facility]

## 2023-06-20 NOTE — TELEPHONE ENCOUNTER
R:  No appropriate [facility] bed is available.    [facility] Access Inpatient Bed Call Log 6/19/23 @9:20 PM    Intake has called facilities that have not updated their bed status within the last 12 hours.              [facility] is posting 0 beds.               [facility] is posting 0 beds. [phone] Per call @ 7:22am no beds available.     [facility] is posting 0 beds. [phone]               [facility] is posting 0 beds. [phone] Per call @ 7:24am at capacity can cb later for an update.      [facility] is posting 0 beds. [phone]             [facility] is posting 0 beds. [phone]             [facility] is posting 0 beds. [phone]             [facility] is posting 0 beds. [phone]      [facility] is posting 0 beds. [phone]                                 Pt remains on work list pending appropriate bed availability.

## 2023-06-20 NOTE — ED PROVIDER NOTES
[facility] ED Mental Health Handoff Note:       Brief HPI:  This is a 37 year old male signed out to me.  See initial ED Provider note for full details of the presentation. Interval history is pertinent for ongoing ED boarding.    Home meds reviewed and ordered/administered: Yes    Medically stable for inpatient mental health admission: Yes.    Evaluated by mental health: Yes. The recommendation is for inpatient mental health treatment. Bed search in process    Safety concerns: At the time I received sign out, there were no safety concerns.    Hold Status:  Active Orders   N/A       Exam:   Patient Vitals for the past 24 hrs:   BP Temp Temp src Pulse Resp SpO2   06/20/23 0909 125/74 97.5  F (36.4  C) Oral 81 14 100 %       ED Course:    Medications   OLANZapine zydis (zyPREXA) ODT tab 10 mg (10 mg Oral Not Given 6/19/23 2125)   OLANZapine zydis (zyPREXA) ODT tab 5 mg (5 mg Oral $Given 6/20/23 0913)   OLANZapine zydis (zyPREXA) ODT tab 10 mg (10 mg Oral $Given 6/18/23 0523)            There were no significant events during my shift.      Impression:    ICD-10-CM    1. Psychosis, unspecified psychosis type (H)  F29       2. History of methamphetamine use  F15.91           Plan:    1. Awaiting mental health evaluation/recommendations.      RESULTS:   No results found for this visit on 06/18/23 (from the past 24 hour(s)).          MD Eliu Lomeli Dung Hoang, MD  06/20/23 1270

## 2023-06-20 NOTE — ED NOTES
Patient was alert but disoriented to time, place, and situation. Patient was cooperative for assessment. Patient endorses suicidal ideation without plan; however, contracts for safety. Patient denies anxiety and depression, hallucinations, and homicidal ideation. Patient has poor body hygiene. Patient ate 100% of his dinner. Patient will be monitored closely.

## 2023-06-20 NOTE — TELEPHONE ENCOUNTER
0137 Bed Search Update:    [facility]: at capacity  [facility]: at capacity per website  [facility]: at capacity per website  [facility]: at capacity per unit staff  [facility]: at capacity per website  [facility]: at capacity per website  [facility]: at capacity per website  [facility]: at capacity per website  [facility]: at capacity per website    Remains on wait list.

## 2023-06-20 NOTE — PROGRESS NOTES
Triage & Transition Services, Extended Care    Client Name: jared Ramirez    Date: June 20, 2023  Service Type:  Group Therapy  Session Start Time:  11:40am     Session End Time: 12:00pm   Session Length: 20 minutes  Site Location: [facility]  Attendees: Patient and other group members  Facilitator:  SUZI Neumann and Akosua Patterson      Topic:   therapeutic journal prompts     Intervention:    Group process: support, challenge, affirm, psycho-education.     Response:  Patient did not participate in group.      SUZI Neumann

## 2023-06-21 ENCOUNTER — TELEPHONE (OUTPATIENT)
Dept: BEHAVIORAL HEALTH | Facility: CLINIC | Age: 38
End: 2023-06-21
Payer: COMMERCIAL

## 2023-06-21 ASSESSMENT — ACTIVITIES OF DAILY LIVING (ADL)
ADLS_ACUITY_SCORE: 35

## 2023-06-21 ASSESSMENT — COLUMBIA-SUICIDE SEVERITY RATING SCALE - C-SSRS
2. HAVE YOU ACTUALLY HAD ANY THOUGHTS OF KILLING YOURSELF?: NO
SUICIDE, SINCE LAST CONTACT: NO
1. SINCE LAST CONTACT, HAVE YOU WISHED YOU WERE DEAD OR WISHED YOU COULD GO TO SLEEP AND NOT WAKE UP?: NO
TOTAL  NUMBER OF ABORTED OR SELF INTERRUPTED ATTEMPTS SINCE LAST CONTACT: NO
ATTEMPT SINCE LAST CONTACT: NO
6. HAVE YOU EVER DONE ANYTHING, STARTED TO DO ANYTHING, OR PREPARED TO DO ANYTHING TO END YOUR LIFE?: NO
TOTAL  NUMBER OF INTERRUPTED ATTEMPTS SINCE LAST CONTACT: NO

## 2023-06-21 NOTE — PROGRESS NOTES
"Triage & Transition Services, Extended Care     Austin Ramirez  2023    Delroy is followed related to Long wait time for admission: since 23. Please see initial DEC Crisis Assessment completed for complete assessment information. Medical record is reviewed. While patient is in the ED, care team is working towards Learn and Demonstrate at Least One Skill Focused on Crisis Stabilization.     Pt reported that he is \"doing good.\"  He denied depression and anxiety.  He denied suicidal thoughts.  He denied auditory and visual hallucinations.  He denied paranoid thoughts and psychotic symptoms.  Pt reported that he just needs a Rule 25 and to go to FILOMENA treatment.  It is noted that a FILOMENA assessment was ordered on  but due to pt's level of psychotic symptoms it was not able to be completed.  Pt continues to be disoriented.  He did not know the day of the week, date, month or year.  He stated that he is currently at [facility].  He believed the president was Carlos.  Pt was unable to provide information about where he has been since his release from alf in March.  He stated that he does not have a  and has .  He denied having any supports.  He did not want me contacting his mother.  Pt reported that he last used meth 2 weeks ago.  He refused his medication this morning.  Pt had poor hygiene.  He requested to terminate the meeting after 10 minutes, stating that he needed to get a drink.    There are not significant status changes.      Plan:  Inpatient Mental Health: Pt continues to present as disorganized.  He is disoriented and a poor historian.  He has not been medication compliant. Inpatient care is needed for safety and stabilization.    An FILOMENA assessment is not appropriate at this time due to the pt's disorientation and disorganization.  His ability to complete an assessment should be monitored and scheduled when appropriate.    Plan for Care reviewed with Assigned Medical " Provider? Yes. Provider, , response: Agreement    Extended Care will follow and meet with patient/family/care team as able or requested.     Cherrie Geller LP  [facility], Extended Care   [phone]

## 2023-06-21 NOTE — ED PROVIDER NOTES
[facility] ED Mental Health Handoff Note:       Brief HPI:  This is a 37 year old male signed out to me.  See initial ED Provider note for full details of the presentation. Interval history is pertinent for ongoing ED boarding. Patient today feels much improved. He wants to be released. He plans on going over to [facility]. He is denying any thoughts of harm to self or others..    Home meds reviewed and ordered/administered: Yes    Medically stable for inpatient mental health admission: Yes.    Evaluated by mental health: Yes. The recommendation is for outpatient mental health treatment. Resources and plan given to patient.    Safety concerns: At the time I received sign out, there were no safety concerns.    Hold Status:  Active Orders   N/A       Exam:   Patient Vitals for the past 24 hrs:   BP Temp Temp src Pulse Resp SpO2   06/20/23 1800 130/69 98.2  F (36.8  C) Oral 83 16 99 %       ED Course:    Medications   OLANZapine zydis (zyPREXA) ODT tab 10 mg (10 mg Oral $Given 6/20/23 2111)   OLANZapine zydis (zyPREXA) ODT tab 5 mg (5 mg Oral Not Given 6/21/23 0914)   OLANZapine zydis (zyPREXA) ODT tab 10 mg (10 mg Oral $Given 6/18/23 0523)            There were no significant events during my shift.    Impression:    ICD-10-CM    1. Psychosis, unspecified psychosis type (H)  F29       2. History of methamphetamine use  F15.91           Plan:    1. Discharged.      RESULTS:   No results found for this visit on 06/18/23 (from the past 24 hour(s)).          MD Eliu Lomeli Dung Hoang, MD  06/21/23 8343

## 2023-06-21 NOTE — TELEPHONE ENCOUNTER
[facility] Access Inpatient Bed Call Log 6/21/23 @12:31 AM    Intake has called facilities that have not updated their bed status within the last 12 hours.                [facility] is posting 0 beds.                [facility] is posting 0 beds. [phone] Per call      [facility] is posting 0 beds. [phone]                [facility] is posting 0 beds. [phone] -12:32AM Per [place], they are capped.      [facility] is posting 0 beds. [phone]              [facility] is posting 0 beds. [phone]              [facility] is posting 0 beds. [phone]              [facility] is posting 0 beds. [phone]       [facility] is posting 0 beds. [phone]              [facility] is posting 0 beds. [phone]                [facility] is posting 2 bed. Mixed unit 12+. Low acuity only. [phone]     [facility] is positing 0 bed. No aggression.  [phone]      [facility] is posting 0 beds. [phone]      [facility] is posting 3 beds. Low acuity only. [phone]     [facility] is posting 1 beds. Low acuity. No current aggression. [phone]      [facility] is posting 0 bed. Low acuity. [phone]     [facility] is posting 1 bed. 72 HH preferred. Low acuity. [phone]     [facility] is posting 2 beds. Low acuity. [phone]      [facility] is posting 0 beds. Vol only, No Hx of aggression, violence, or assault. No sexual offenders. No 72 hr holds. [phone]       [facility] is posting 8 beds. (Must have the cognitive ability to do programming. No aggressive or violent behavior or recent HX in the last 2 yrs. MH must be primary.) [phone]      [facility] is posting 0 beds. Low acuity only. Violence and aggression capped. [phone]       [facility] is posting 2 beds. Low acuity, Neg Covid. [phone]       [facility] is posting 3 beds. Covid neg. Vol only. Combined adolescent and adult unit. No aggressive or violent behavior. No registered sex offenders. [phone]     [facility] posting 4 beds. Negative covid.      [facility] is posting 2 bed. [phone] no wounds, lines, drains, C-paps, tubes and must be able to care for themselves; no hx of aggression.      [facility] is posting 16 beds. Call for details. [phone]      [facility] is posting 4 beds. Mixed unit.  [phone]               Pt remains on work list pending appropriate bed availability.

## 2023-06-21 NOTE — ED NOTES
Patient requesting to leave MD and therapist notified patient is voluntary no hold, he with therapist now.

## 2023-06-21 NOTE — PROGRESS NOTES
[facility] Crisis Reassessment      Austin Ramirez was reassessed at the request of Dr. Nowak for the following reasons: pt requested to discharge. Pt was first seen on 6/18/23 by Marnie Murillo; see the initial assessment note for details.      Patient Presentation    Initial ED presentation details: Pt presented with disorganized thoughts and speech.  He had blunted affect and anxious mood.  Pt had difficulty expressing his thoughts and words.    Current patient presentation: Pt has been in the ED for several days.  He continues to present as disorganized and is a poor historian.  He has not been medication compliant.  He denied suicidal and homicidal thoughts.      Changes observed since initial assessment: Pt has requested to discharge.  Despite some mental health symptoms, pt does not present as an acute risk of harm to self or others.  He does not meet criteria for 72 hour hold.        Risk of Harm    [place] Suicide Severity Rating Scale Full Clinical Version: 6/18/23  Suicidal Ideation  1. Wish to be Dead (Lifetime): No  2. Non-Specific Active Suicidal Thoughts (Lifetime): No     Suicidal Behavior  Actual Attempt (Lifetime): No  Has subject engaged in non-suicidal self-injurious behavior? (Lifetime): No  Interrupted Attempts (Lifetime): No  Preparatory Acts or Behavior (Lifetime): No  C-SSRS Risk (Lifetime/Recent)  Calculated C-SSRS Risk Score (Lifetime/Recent): No Risk Indicated    [place] Suicide Severity Rating Scale Since Last Contact: 6/21/23  Suicidal Ideation (Since Last Contact)  1. Wish to be Dead (Since Last Contact): No  2. Non-Specific Active Suicidal Thoughts (Since Last Contact): No  Suicidal Behavior (Since Last Contact)  Actual Attempt (Since Last Contact): No  Has subject engaged in non-suicidal self-injurious behavior? (Since Last Contact): No  Interrupted Attempts (Since Last Contact): No  Aborted or Self-Interrupted Attempt (Since Last Contact): No  Preparatory Acts or Behavior (Since Last  Contact): No  Suicide (Since Last Contact): No     C-SSRS Risk (Since Last Contact)  Calculated C-SSRS Risk Score (Since Last Contact): No Risk Indicated    Does the patient have thoughts of harming others? No    Mental Status Exam   Affect: Flat   Appearance: Disheveled    Attention Span/Concentration: Attentive?    Eye Contact: Other: appropriate   Fund of Knowledge: Delayed    Language /Speech Content: Other: Minial responses   Language /Speech Volume: Normal    Language /Speech Rate/Productions: Minimally Responsive    Recent Memory: Poor   Remote Memory: Poor   Mood: Normal    Orientation to Person: Yes    Orientation to Place: Answer: [place]   Orientation to Time of Day: Yes    Orientation to Date: No    Situation (Do they understand why they are here?): Yes    Psychomotor Behavior: Normal    Thought Content: Other: Denied hallucinations, paranoia, suicidal thoughts   Thought Form: Goal Directed       Additional Collateral Information   Pt declined to sign releases for any contacts.    Diagnosis:   298.9 (F29)  Unspecified Schizophrenia Spectrum, by history   Substance-Related & Addictive Disorders 292.89 Stimulant Intoxication,  292.9 Unspecified Stimulant Related Disorder:  F15.99) Amphetamine or otehr stimulant - primary   301.7 (F60.2) Antisocial Personality Disorder      Clinical Substantiation of Recommendations  Pt reported that he wants to get a Rule 25 and go to FILOMENA treatment.  [facility]  He stated that he is safe to discharge.  He denied suicidal thoughts, intent and plan.  Pt is not an acute risk of harm to self or others.  Although there is concern that pt will not follow through with FILOMENA treatment and will use substances, the pt has the right to make his own choices and does not meet criteria for a 72 hour hold.    Plan:    Disposition  Recommended disposition: Rule 25/FILOMENA Assessment        Reviewed case and recommendations with attending provider.  Attending Name: Dr. Nowak      Attending concurs with disposition: Yes      Patient and/or verified legal guardian concurs with disposition: Yes      Final disposition: Rule 25/FILOMENA Assessment.         Assessment Details  Total duration spent on the patient case in minutes: .50 hrs     CPT code(s) utilized: 05898 - Psychotherapy for Crisis - 60 (30-74*) min       Cherrie Geller LP, [facility]  Callback: [phone]     Aftercare Plan      If I am feeling unsafe or I am in a crisis, I will:       Contact my established care providers       Call the National Suicide Prevention Lifeline: 988      Go to the nearest emergency room       Call 911             Warning signs that I or other people might notice when a crisis is developing for me: voices, chemical use, anxiety      Things I am able to do on my own to cope or help me feel better:       *Take a time out.  Practice yoga, listen to music, meditate, learn relaxation techniques.  Stepping back from the problem helps clear your head.   *Welcome humor.  A good laugh goes a long way.   *Accept that you cannot control everything.  Put your stress in perspective: is it really as bad as you think.   *Focus on positive self-talk vs negative self-talk   *Journaling   *Take a warm bath or shower       Things that I am able to do with others to cope or help me better:      *Listen and talk about concerns.   *Go for a walk or an outing (dinner, movie, park)   *Reach out to family/friends by text or phone          Things I can use or do for distraction:       *Watch TV or a movie.   *Watch Vettroube videos   *Listen to music and sing out loud.   *Grab a snack   *Play a game or video game   *Puzzles, word finds, coloring   *Read a book            Changes I can make to support my mental health and wellness:       *Eat well-balanced meals.  Do not skip any meals.  Keep healthy, energy-boosting snacks on hand.   *Limit alcohol and caffeine.   *Exercise daily.    *Surround yourself with  positive people.   *Set realistic goals.   *Use community resources, including hotline numbers, Critical access hospital crisis and support meetings   *Maintain a daily schedule/routine           Reduce Extreme Emotion ?QUICKLY:??Changing Your Body Chemistry?      T: ?Change your body Temperature to change your autonomic nervous system?       1. Use Ice Water to calm yourself down FAST?      2. Put your face in a bowl of ice water (this is the best way; have the person keep his/her face in ice water for 30-45 seconds -?initial research is showing that the longer s/he can hold her/his face in the water, the better the response), or?      3. Splash ice water on your face, or?hold an ice pack on your face?      ?   I: ?Intensely exercise to calm down a body revved up by emotion?      Examples: running, walking fast, jumping, playing basketball, weight lifting, swimming, calisthenics, etc.?      Engage in exercises that DO NOT include violent behaviors. Exercises that utilize violent behaviors tend to function as  behavioral rehearsal, ?and rather than calming the person down, may actually  rev ?the person up more, increasing the likelihood of violence, and lessening the likelihood that they will  burn off ?energy         P: ?Progressively relax your muscles?      Starting with your hands, moving to your forearms, upper arms, shoulders, neck, forehead, eyes, cheeks and lips, tongue and teeth, chest, upper back, stomach, buttocks, thighs, calves, ankles, feet?      Tense (10 seconds,   of the way), then relax each muscle (all the way)?      Notice the tension?      Notice the difference when relaxed (by tensing first, and then relaxing, you are able to get a more thorough relaxation than by simply relaxing)?      ?   P: Paced breathing to relax?      1.The standard technique is to begin with counting the number of steps one takes for a typical inhale, then counting the steps one takes for a typical exhale, and then lengthening the amount  of steps for the exhalation by one or two steps. ?OR      2.Repeat this pattern for 1-2 minutes      3.Inhale for four (4) seconds?      4.Exhale for six (6) to eight (8) seconds?      5.Research demonstrated that one can change one's overall level of anxiety by doing this exercise for even a few minutes per day??         After using Distress Tolerance TIPP, TRY TO STOP!        S- Stop        Do not just react on your emotion urge. Stop! Freeze! Do not move a muscle! Your emotions may try to make you act without thinking. Stay in control! Take a step back Take a step back from the situation.        T- Take a break        Let go. Take a deep breath. Do not let your feelings make you act impulsively.        O- Observe        Notice what is going on inside and outside you. What is the situation? What are your thoughts and feelings? What are others saying or doing? Does my emotion make sense, is it justified? What is it that my emotions want me to do? Would that be effective?        P- Proceed mindfully        Act with awareness. In deciding what to do, consider your thoughts and feelings, the situation, and other people s thoughts and feelings. Think about your goals. Ask Wise Mind: Which actions will make it better or worse?       People in my life that I can ask for help: family, mental health professionals      Your [facility] has a mental health crisis team you can call 24/7: [facility] Crisis  [phone] (adults)  [phone] (children)         Other things that are important when I'm in crisis:       *Value yourself.  Treat yourself with kindness and respect, avoid self-criticism.   *Get help when you need it.  Seeking help is a sign of strength, not weakness.  Remember that treatment is effective.          Additional resources and information:       Crisis Lines      Crisis Text Line  Text 751988  You will be connected with a trained live crisis counselor to provide support.      Community Resources  "     Fast Tracker  Linking people to mental health and substance use disorder resources  fastSitatByoot.comckermn.org       [place] Mental Health Warm Line  Peer to peer support  Monday thru Saturday, 12 pm to 10 pm  019.314.6768 or 0.040.197.5285  Text \"Support\" to 13250      [employer]ness (MIKE)  139.755.1936 or 1.888.MIKE.HELPS         [facility] Phone (free remote counseling): 372.460.4715 Web address:       https://Bandtastic.Care Team Connect/       MoSo.psychologytoday.com (filter for insurance, gender preference, etc.)      Mental Health Apps      My3  https://Invincea/      VirtualHopeBox  https://QuantiSense/apps/virtual-hope-box/      Additional Information      Today you were seen by a licensed mental health professional through Triage and Transition services, [facility] ([facility])  for a crisis assessment in the Emergency Department at [facility].  It is recommended that you follow up with your established providers (psychiatrist, mental health therapist, and/or primary care doctor - as relevant) as soon as possible. Coordinators from [facility] will be calling you in the next 24-48 hours to ensure that you have the resources you need.  You can also contact [facility] coordinators directly at [phone]. You may have been scheduled for or offered an appointment with a mental health provider. [facility] maintains an extensive network of licensed behavioral health providers to connect patients with the services they need.  We do not charge providers a fee to participate in our referral network.  We match patients with providers based on a patient's specific needs, insurance coverage, and location.  Our first effort will be to refer you to a provider within your care system, and will utilize providers outside your care system as needed.      "

## 2023-06-21 NOTE — TELEPHONE ENCOUNTER
[facility] Access Inpatient Bed Call Log 6/20/23 @4:50 PM      Intake has called facilities that have not updated their bed status within the last 12 hours.                  [facility] is posting 0 beds.                [facility] is posting 0 beds. [phone] Per call @8:22am, no available beds.     [facility] is posting 0 beds. [phone]                [facility] is posting 0 beds. [phone]      [facility] is posting 0 beds. [phone]              [facility] is posting 0 beds. [phone]              [facility] is posting 0 beds. [phone]              [facility] is posting 0 beds. [phone]       [facility] is posting 0 beds. [phone]                [facility] is posting 0 beds. [phone]                [facility] is posting 2 bed. Mixed unit 12+. Low acuity only. [phone] Per website @7:26am     [facility] is positing 0 bed. No aggression.  [phone]      [facility] is posting 0 beds. [phone] Per voicemail @8:08am, at capacity.      [facility] is posting 3 beds. Low acuity only. [phone] Per call @7:28am     [facility] is posting 0 beds. Low acuity. No current aggression. [phone]      [facility] is posting 0 bed. Low acuity. [phone]     [facility] is posting 1 bed. 72 HH preferred. Low acuity. [phone] Per call @8:18am, Intake to call after 9:30am.     [facility] is posting 4 beds. Low acuity. [phone]      [facility] is posting 0 beds. Vol only, No Hx of aggression, violence, or assault. No sexual offenders. No 72 hr holds. [phone]       [facility] is posting 8 beds. (Must have the cognitive ability to do programming. No aggressive or violent behavior or recent HX in the last 2 yrs. MH must be primary.) [phone] Per website @2:50am     [facility] is posting 0 beds. Low  acuity only. Violence and aggression capped. [phone]       [facility] is posting 2 beds. Low acuity, Neg Covid. [phone] Per call, no capacity today.     [facility] is posting 3 beds. Covid neg. Vol only. Combined adolescent and adult unit. No aggressive or violent behavior. No registered sex offenders. [phone]     [facility] posting 4 beds. Negative covid. Per website @7:02am     [facility] is posting 2 bed. [phone] no wounds, lines, drains, C-paps, tubes and must be able to care for themselves; no hx of aggression. Per call @8:12am     [facility] is posting 16 beds. Call for details. [phone] Per call @7:58am, beds available after expected d/c     [facility] is posting 4 beds. Mixed unit.  [phone] Per call @8:06am                  Pt remains on work list pending appropriate bed availability.

## 2023-06-21 NOTE — PROGRESS NOTES
"Triage & Transition Services, Extended Care      Client Name: Austin Ramirez \"Delroy\"   Date: June 21, 2023  Service Type:  Group Therapy  Site Location: [facility]  Facilitator: Kristine Cedeno     Topic:   Art Group: [facility] [employer]     Intervention:    Patient was in the process of discharging and did not participate in group.     Kristine Cedeno  Extended Care Coordinator  "

## 2023-06-21 NOTE — ED NOTES
Pt to be dc'd per his insist. Belongings returned. Cab to go to [address]. Refused sandwiches and water. Pt to exit via ambulatory w/ belongings to cab . Refused avs

## 2023-06-21 NOTE — ED NOTES
Pt asleep in early rounds; awake and ate well for breakfast then back to sleep. Refused Zyprexa; states he did not need it. He did fill out menu.

## 2023-06-21 NOTE — DISCHARGE INSTRUCTIONS
Aftercare Plan     If I am feeling unsafe or I am in a crisis, I will:      Contact my established care providers      Call the National Suicide Prevention Lifeline: 988     Go to the nearest emergency room      Call 911           Warning signs that I or other people might notice when a crisis is developing for me: voices, chemical use, anxiety     Things I am able to do on my own to cope or help me feel better:      *Take a time out.  Practice yoga, listen to music, meditate, learn relaxation techniques.  Stepping back from the problem helps clear your head.   *Welcome humor.  A good laugh goes a long way.   *Accept that you cannot control everything.  Put your stress in perspective: is it really as bad as you think.   *Focus on positive self-talk vs negative self-talk   *Journaling   *Take a warm bath or shower      Things that I am able to do with others to cope or help me better:     *Listen and talk about concerns.   *Go for a walk or an outing (dinner, movie, park)   *Reach out to family/friends by text or phone        Things I can use or do for distraction:      *Watch TV or a movie.   *Watch [employer] videos   *Listen to music and sing out loud.   *Grab a snack   *Play a game or video game   *Puzzles, word finds, coloring   *Read a book          Changes I can make to support my mental health and wellness:      *Eat well-balanced meals.  Do not skip any meals.  Keep healthy, energy-boosting snacks on hand.   *Limit alcohol and caffeine.   *Exercise daily.    *Surround yourself with positive people.   *Set realistic goals.   *Use community resources, including hotline numbers, [facility] crisis and support meetings   *Maintain a daily schedule/routine          Reduce Extreme Emotion ?QUICKLY:??Changing Your Body Chemistry?     T: ?Change your body Temperature to change your autonomic nervous system?      1. Use Ice Water to calm yourself down FAST?     2. Put your face in a bowl of ice water (this is the best way;  have the person keep his/her face in ice water for 30-45 seconds -?initial research is showing that the longer s/he can hold her/his face in the water, the better the response), or?     3. Splash ice water on your face, or?hold an ice pack on your face?     ?   I: ?Intensely exercise to calm down a body revved up by emotion?     Examples: running, walking fast, jumping, playing basketball, weight lifting, swimming, calisthenics, etc.?     Engage in exercises that DO NOT include violent behaviors. Exercises that utilize violent behaviors tend to function as  behavioral rehearsal, ?and rather than calming the person down, may actually  rev ?the person up more, increasing the likelihood of violence, and lessening the likelihood that they will  burn off ?energy        P: ?Progressively relax your muscles?     Starting with your hands, moving to your forearms, upper arms, shoulders, neck, forehead, eyes, cheeks and lips, tongue and teeth, chest, upper back, stomach, buttocks, thighs, calves, ankles, feet?     Tense (10 seconds,   of the way), then relax each muscle (all the way)?     Notice the tension?     Notice the difference when relaxed (by tensing first, and then relaxing, you are able to get a more thorough relaxation than by simply relaxing)?     ?   P: Paced breathing to relax?     1.The standard technique is to begin with counting the number of steps one takes for a typical inhale, then counting the steps one takes for a typical exhale, and then lengthening the amount of steps for the exhalation by one or two steps. ?OR     2.Repeat this pattern for 1-2 minutes     3.Inhale for four (4) seconds?     4.Exhale for six (6) to eight (8) seconds?     5.Research demonstrated that one can change one's overall level of anxiety by doing this exercise for even a few minutes per day??        After using Distress Tolerance TIPP, TRY TO STOP!       S- Stop       Do not just react on your emotion urge. Stop! Freeze! Do not  "move a muscle! Your emotions may try to make you act without thinking. Stay in control! Take a step back Take a step back from the situation.       T- Take a break       Let go. Take a deep breath. Do not let your feelings make you act impulsively.       O- Observe       Notice what is going on inside and outside you. What is the situation? What are your thoughts and feelings? What are others saying or doing? Does my emotion make sense, is it justified? What is it that my emotions want me to do? Would that be effective?       P- Proceed mindfully       Act with awareness. In deciding what to do, consider your thoughts and feelings, the situation, and other people s thoughts and feelings. Think about your goals. Ask Wise Mind: Which actions will make it better or worse?      People in my life that I can ask for help: family, mental health professionals     Your [facility] has a mental health crisis team you can call 24/7: [facility]  [phone] (adults)  [phone] (children)       Other things that are important when I'm in crisis:      *Value yourself.  Treat yourself with kindness and respect, avoid self-criticism.   *Get help when you need it.  Seeking help is a sign of strength, not weakness.  Remember that treatment is effective.         Additional resources and information:      Crisis Lines     Crisis Text Line  Text 688576  You will be connected with a trained live crisis counselor to provide support.     Community Resources     Fast Tracker  Linking people to mental health and substance use disorder resources  fasttrackermn.org      [place] [facility] Warm Line  Peer to peer support  Monday thru Saturday, 12 pm to 10 pm  245.128.2924 or 8.161.452.0734  Text \"Support\" to 13618     National [place] on Mental Illness (MIKE)  791.866.6128 or 1.888.MIKE.HELPS       [facility] Phone (free remote counseling): 214.825.4686 Web address:      https://walkin.org/     "   www.nVoq.PressConnect (filter for insurance, gender preference, etc.)     Mental Health Apps     My3  https://NextInput.org/     VirtualHopeBox  https://[employer]/apps/virtual-hope-box/     Additional Information     Today you were seen by a licensed mental health professional through Triage and Transition services, [facility])  for a crisis assessment in the Emergency Department at [facility].  It is recommended that you follow up with your established providers (psychiatrist, mental health therapist, and/or primary care doctor - as relevant) as soon as possible. Coordinators from [facility] will be calling you in the next 24-48 hours to ensure that you have the resources you need.  You can also contact [facility] coordinators directly at [phone]. You may have been scheduled for or offered an appointment with a mental health provider. [facility] maintains an extensive network of licensed behavioral health providers to connect patients with the services they need.  We do not charge providers a fee to participate in our referral network.  We match patients with providers based on a patient's specific needs, insurance coverage, and location.  Our first effort will be to refer you to a provider within your care system, and will utilize providers outside your care system as needed.

## 2023-07-06 ENCOUNTER — HOSPITAL ENCOUNTER (EMERGENCY)
Facility: HOSPITAL | Age: 38
Discharge: HOME | End: 2023-07-07
Attending: STUDENT IN AN ORGANIZED HEALTH CARE EDUCATION/TRAINING PROGRAM | Admitting: STUDENT IN AN ORGANIZED HEALTH CARE EDUCATION/TRAINING PROGRAM
Payer: COMMERCIAL

## 2023-07-06 ENCOUNTER — HOSPITAL ENCOUNTER (EMERGENCY)
Facility: HOSPITAL | Age: 38
Discharge: NURSING, REHAB OR LONG-TERM CARE | End: 2023-07-06
Payer: COMMERCIAL

## 2023-07-06 ENCOUNTER — HOSPITAL ENCOUNTER (EMERGENCY)
Facility: HOSPITAL | Age: 38
End: 2023-07-06
Payer: COMMERCIAL

## 2023-07-06 VITALS
RESPIRATION RATE: 14 BRPM | SYSTOLIC BLOOD PRESSURE: 138 MMHG | TEMPERATURE: 98.5 F | HEART RATE: 72 BPM | WEIGHT: 220.46 LBS | DIASTOLIC BLOOD PRESSURE: 78 MMHG | OXYGEN SATURATION: 99 % | BODY MASS INDEX: 28.31 KG/M2

## 2023-07-06 VITALS
WEIGHT: 220.46 LBS | OXYGEN SATURATION: 99 % | HEART RATE: 85 BPM | DIASTOLIC BLOOD PRESSURE: 68 MMHG | TEMPERATURE: 98.5 F | RESPIRATION RATE: 16 BRPM | SYSTOLIC BLOOD PRESSURE: 120 MMHG

## 2023-07-06 DIAGNOSIS — M25.572 CHRONIC ANKLE PAIN, BILATERAL: ICD-10-CM

## 2023-07-06 DIAGNOSIS — Z59.00 HOMELESSNESS: ICD-10-CM

## 2023-07-06 DIAGNOSIS — S90.811A ABRASION OF RIGHT FOOT, INITIAL ENCOUNTER: ICD-10-CM

## 2023-07-06 DIAGNOSIS — M25.571 CHRONIC ANKLE PAIN, BILATERAL: ICD-10-CM

## 2023-07-06 DIAGNOSIS — G89.29 CHRONIC ANKLE PAIN, BILATERAL: ICD-10-CM

## 2023-07-06 PROCEDURE — 250N000009 HC RX 250

## 2023-07-06 PROCEDURE — 250N000011 HC RX IP 250 OP 636

## 2023-07-06 PROCEDURE — 99283 EMERGENCY DEPT VISIT LOW MDM: CPT | Mod: 25

## 2023-07-06 PROCEDURE — 90471 IMMUNIZATION ADMIN: CPT

## 2023-07-06 PROCEDURE — 99282 EMERGENCY DEPT VISIT SF MDM: CPT

## 2023-07-06 PROCEDURE — 90715 TDAP VACCINE 7 YRS/> IM: CPT

## 2023-07-06 RX ORDER — GINSENG 100 MG
CAPSULE ORAL ONCE
Status: COMPLETED | OUTPATIENT
Start: 2023-07-06 | End: 2023-07-06

## 2023-07-06 RX ADMIN — BACITRACIN: 500 OINTMENT TOPICAL at 18:23

## 2023-07-06 RX ADMIN — CLOSTRIDIUM TETANI TOXOID ANTIGEN (FORMALDEHYDE INACTIVATED), CORYNEBACTERIUM DIPHTHERIAE TOXOID ANTIGEN (FORMALDEHYDE INACTIVATED), BORDETELLA PERTUSSIS TOXOID ANTIGEN (GLUTARALDEHYDE INACTIVATED), BORDETELLA PERTUSSIS FILAMENTOUS HEMAGGLUTININ ANTIGEN (FORMALDEHYDE INACTIVATED), BORDETELLA PERTUSSIS PERTACTIN ANTIGEN, AND BORDETELLA PERTUSSIS FIMBRIAE 2/3 ANTIGEN 0.5 ML: 5; 2; 2.5; 5; 3; 5 INJECTION, SUSPENSION INTRAMUSCULAR at 18:22

## 2023-07-06 SDOH — ECONOMIC STABILITY - HOUSING INSECURITY: HOMELESSNESS UNSPECIFIED: Z59.00

## 2023-07-06 ASSESSMENT — ENCOUNTER SYMPTOMS
BRUISES/BLEEDS EASILY: 0
COLOR CHANGE: 0
NUMBNESS: 0
CHILLS: 0
WOUND: 1
ARTHRALGIAS: 1
WEAKNESS: 0
JOINT SWELLING: 0
FEVER: 0
SHORTNESS OF BREATH: 0

## 2023-07-06 ASSESSMENT — ACTIVITIES OF DAILY LIVING (ADL): ADLS_ACUITY_SCORE: 35

## 2023-07-06 NOTE — ED PROVIDER NOTES
"12/11/2020      RE: Jim Willingham  [address] [facility] [address]       Dear Colleague,    Thank you for the opportunity to participate in the care of your patient, Jim Willingham, at the [facility]. Please see a copy of my visit note below.    Jim Willingham is a 61 year old male who is being evaluated via a billable video visit.      The patient has been notified of following:     \"This video visit will be conducted via a call between you and your physician/provider. We have found that certain health care needs can be provided without the need for an in-person physical exam.  This service lets us provide the care you need with a video conversation.  If a prescription is necessary we can send it directly to your pharmacy.  If lab work is needed we can place an order for that and you can then stop by our lab to have the test done at a later time.    Video visits are billed at different rates depending on your insurance coverage.  Please reach out to your insurance provider with any questions.    If during the course of the call the physician/provider feels a video visit is not appropriate, you will not be charged for this service.\"    Patient has given verbal consent for Video visit? Yes  How would you like to obtain your AVS? MyChart  If you are dropped from the video visit, the video invite should be resent to: Patient will be logging into My Chart  Will anyone else be joining your video visit? No         Video-Visit Details    Type of service:  Video Visit    Video Start Time: 2:27 PM  Video End Time: 3:03 PM    Originating Location (pt. Location): Home    Distant Location (provider location):  [facility]     Platform used for Video Visit: Doximity    December 11, 2020   Jim Willingham is a 60 year old male with a past medical history of NICM (EF 20%) s/p HM II LVAD placement (6/19/17) at [facility] (obesity) " "EMERGENCY DEPARTMENT ENCOUNTER      NAME: Austin Ramirez  AGE: 37 year old male  YOB: 1985  MRN: [id number]  EVALUATION DATE & TIME: 2023  5:45 PM    PCP: No primary care provider on file.    ED PROVIDER: Socorro Medina PA-C      Chief Complaint   Patient presents with     Ankle Pain         FINAL IMPRESSION:  1. Chronic ankle pain, bilateral    2. Abrasion of right foot, initial encounter          ED COURSE & MEDICAL DECISION MAKIN:54 PM I met with the patient to gather history and perform my exam. ED course and treatment discussed. Patient declines any work-up for ankle pain including laboratories CBC, BMP, Mg, CK, or imaging including XR and/or US and only requests \"pop\" and \"rest\" and \"shelter.\" I discussed the plan for discharge with the patient, and patient is agreeable. We discussed supportive cares at home and reasons for return to the ER including new or worsening symptoms. All questions and concerns addressed. Patient to be discharged by RN.    Pertinent Labs & Imaging studies reviewed. (See chart for details)  37 year old male with a history of homelessness presents to the Emergency Department for evaluation of bilateral ankle pain and wrist pain, which patient states is chronic and from \"walking too much.\" Upon exam, patient is afebrile, hemodynamically stable, and in no acute distress. Feet with bilateral ACE wraps in place and abrasion to right dorsum foot. Both feet appear moist/wrickled and malodorous, but with no current evidence of trench foot or cellulitis. Differential diagnosis includes but not limited to abrasion, osteoarthritic flare, bursitis, plantar fasciitis, fracture, bony lesion, ankle sprain, muscle strain, electrolyte abnormality, rhabdomyolysis, anemia, dehydration. However, patient declines any work-up for symptoms including laboratories CBC, BMP, Mg, CK, or imaging including XR and/or US and only requests \"pop\" and \"rest\" and \"shelter.\" Given patient " "overall hemodynamically stable with relatively benign exam other than abrasion to right foot, feel OK to defer labs/imaging at this time as symptoms chronic in nature and close PCP follow up, referral placed today. Patient additionally declining pain medication and again only requests \"pop\" and \"rest.\" Abrasion was treated with bacitracin and non-adherent bandage. Plan to discharge patient home with clean socks, strict return precautions, and close follow up with PCP for reevaluation and ongoing management, referral placed today. List of housing resources provided to patient today. The patient was stable and well appearing upon discharge. The patient was advised to return to the ER if any new or worsening symptoms develop. The patient verbalizes understanding and agrees with the plan.     Medical Decision Making    History:    Supplemental history from: Documented in chart, if applicable    External Record(s) reviewed: Documented in chart, if applicable.    Work Up:    Chart documentation includes differential considered and any EKGs or imaging independently interpreted by provider, where specified.    In additional to work up documented, I considered the following work up: Documented in chart, if applicable.    External consultation:    Discussion of management with another provider: Documented in chart, if applicable    Complicating factors:    Care impacted by chronic illness: Smoking / Nicotine Use    Care affected by social determinants of health: Housing Insecurity    Disposition considerations: Discharge. No recommendations on prescription strength medication(s). See documentation for any additional details.        MEDICATIONS GIVEN IN THE EMERGENCY:  Medications   bacitracin ointment ( Topical $Given 7/6/23 1823)   Tdap (tetanus-diphtheria-acell pertussis) (ADACEL) injection 0.5 mL (0.5 mLs Intramuscular $Given 7/6/23 1822)       NEW PRESCRIPTIONS STARTED AT TODAY'S ER VISIT  There are no discharge " Other relavant history includes a. Fib, chronic kidney disease stage III, diabetes mellitus type 2, RV failure, CECILIA, obesity, hypertension, COPD, asthma. He is here for urgent heart failure and LVAD follow up.    10/22 Dr. Sinclair  Seen for history of CoNS bacteremia. Plan: - Will complete a 6 week course of IV vancomycin (stop date 10/30).Will repeat blood cx x2 ~5-7 days after stopping abx.  PICC can be pulled at that time. If he has recurrent CoNS bacteremia, would then plan on lifelong suppression with oral antibiotics.      Over the past few visits he did have escalating diuretic doses. He left the hospital on 1mg buex BID and now uptitrated to 5mg BID. Weight has been increasing. He did have RHC to correlate the numbers, once he was very dry while other time he was volume overloaded. Very challenging fluid management.  Today I am seeing him as an urgent appointment following an ICD shock  Today around 450 in the morning he received an appropriate ICD shock which was at a high rate above 220 bpm V. fib arrest.  The shock was successful and he was back in sinus rhythm afterwards.  Interrogation reviewed impression.  He tells me that over the past week or so he has lost a lot of weight and currently he weighs about 223 pounds.  He is urinating quite a bit with a 5 mg twice daily Bumex dosing.  He does think on the same hand that he did gain quite a bit of food weight.  He takes all medications as prescribed.  He feels a little bit lousy today after the stroke but otherwise denies any complaints.  Denies any bleeding no dizziness lightheadedness no syncope.  No strokelike symptoms no headaches at this point.  He did have labs checked today potassium was slightly low and creatinine was 2.16, higher than before especially from a few weeks from a month ago when it was like 1.5.  No other complaints today.  LVAD parameters reviewed with him.  Flow currently 6.7 L/min PI 68.6 which is lower than usually for him is  "medications for this patient.         =================================================================    HPI    Patient information was obtained from: Patient     Use of : N/A       Austin Ramirez is a 37 year old male with a pertinent history of smoking and homelessness who presents to this ED via EMS for evaluation of foot pain    The patient states \"my wrists and my ankles\" started hurting last week. The patient states it has \"gotten worse\" and that \"both ankles\" hurt. The patient states he has been \"walking too much\" and that \"shelter\", \"rest\", and \"maybe a pop\" will help make them feel better. The patient notes a \"little tingling\" in his feet, but denies numbness. The patient is wearing ace wrap and hospital socks to bilateral feet. When removed, the patient endorsed an abrasion to the top of the right foot that \"burns like hell\" and that he \"forgot\" about. The patient states he sustained the abrasion from \"walking too much\". The patient notes that he does not take his shoes or socks off often.   The patient denies fever or chills    The patient denies having a primary care provider or any place to stay right now. The patient is requesting housing or is there is a shelter in the area.     REVIEW OF SYSTEMS   Review of Systems   Constitutional: Negative for chills and fever.   Respiratory: Negative for shortness of breath.    Cardiovascular: Negative for chest pain and leg swelling.   Musculoskeletal: Positive for arthralgias (both ankles). Negative for joint swelling.   Skin: Positive for wound (abrasion to right foot). Negative for color change and pallor.   Allergic/Immunologic: Negative for immunocompromised state.   Neurological: Negative for weakness and numbness.   Hematological: Does not bruise/bleed easily.   All other systems reviewed and are negative.       PAST MEDICAL HISTORY:  History reviewed. No pertinent past medical history.    PAST SURGICAL HISTORY:  History reviewed. No " running on the 4.5 range is reviewed, he is power is 6.8 and his speed is set at 9400.      Cardiac Medications  ASA 81 mg daily  Coumadin  Bumex 5 twice daily  Amiodarone 400 mg once a day  Hydralazine 30 TID  KCl 20/10 daily        PAST MEDICAL HISTORY:  Past Medical History:   Diagnosis Date     Benign essential hypertension 5/11/2017     Bilateral carpal tunnel syndrome 11/2/2020     Cardiomyopathy, unspecified (H) 5/8/2017     CKD (chronic kidney disease) stage 3, GFR 30-59 ml/min 5/11/2017     COPD (chronic obstructive pulmonary disease) (H) 11/2/2020     Depression 5/11/2017     Diabetes mellitus (H) 5/11/2017     Gouty arthropathy, chronic, without tophi 11/2/2020     H/O gastric bypass 5/11/2017     ICD (implantable cardioverter-defibrillator), biventricular, in situ 5/11/2017     LVAD (left ventricular assist device) present (H)      Major depression, recurrent, chronic (H) 11/2/2020     NICM (nonischemic cardiomyopathy) (H)/ EF 20% 5/11/2017    ECHO: LVEDd. 7.66 cm, Restrictive pattern , Severe mitral valve regurgitation     CECILIA (obstructive sleep apnea) 5/11/2017     Paroxysmal atrial fibrillation (H) 5/11/2017     Paroxysmal VT (H) 5/11/2017     Rotator cuff tear arthropathy of both shoulders 11/2/2020     Uncomplicated asthma      Vitamin B12 deficiency (non anemic) 5/11/2017     FAMILY HISTORY:  Family History   Problem Relation Age of Onset     Cerebrovascular Disease Mother 64     Diabetes Mother      Hypertension Mother      Coronary Artery Disease Father      Diabetes Type 2  Father      Obesity Brother      Obesity Brother      Cerebrovascular Disease Daughter 40     SOCIAL HISTORY:  Social History     Socioeconomic History     Marital status:      Spouse name: Not on file     Number of children: Not on file     Years of education: Not on file     Highest education level: Not on file   Occupational History     Not on file   Social Needs     Financial resource strain: Not on file     Food  insecurity     Worry: Not on file     Inability: Not on file     Transportation needs     Medical: Not on file     Non-medical: Not on file   Tobacco Use     Smoking status: Former Smoker     Quit date:      Years since quittin.9     Smokeless tobacco: Never Used   Substance and Sexual Activity     Alcohol use: No     Drug use: No     Sexual activity: Yes     Partners: Female   Lifestyle     Physical activity     Days per week: Not on file     Minutes per session: Not on file     Stress: Not on file   Relationships     Social connections     Talks on phone: Not on file     Gets together: Not on file     Attends Spiritism service: Not on file     Active member of club or organization: Not on file     Attends meetings of clubs or organizations: Not on file     Relationship status: Not on file     Intimate partner violence     Fear of current or ex partner: Not on file     Emotionally abused: Not on file     Physically abused: Not on file     Forced sexual activity: Not on file   Other Topics Concern     Parent/sibling w/ CABG, MI or angioplasty before 65F 55M? No   Social History Narrative     Not on file     CURRENT MEDICATIONS:  Current Outpatient Medications   Medication     acetaminophen (TYLENOL) 325 MG tablet     albuterol (PROAIR HFA) 108 (90 Base) MCG/ACT inhaler     allopurinol (ZYLOPRIM) 300 MG tablet     amiodarone (PACERONE) 200 MG tablet     aspirin 81 MG chewable tablet     blood glucose monitoring (ONE TOUCH ULTRA 2) meter device kit     blood glucose VI test strip     bumetanide (BUMEX) 2 MG tablet     buPROPion (WELLBUTRIN XL) 150 MG 24 hr tablet     cyanocobalamin (VITAMIN B12) 1000 MCG/ML injection     fluticasone-vilanterol (BREO ELLIPTA) 200-25 MCG/INH oral inhaler     gabapentin (NEURONTIN) 300 MG capsule     hydrALAZINE (APRESOLINE) 10 MG tablet     insulin glargine (LANTUS SOLOSTAR) 100 UNIT/ML pen     insulin lispro (HUMALOG KWIKPEN) 100 UNIT/ML (1 unit dial) KWIKPEN     insulin pen  pertinent surgical history.        CURRENT MEDICATIONS:    No current outpatient medications on file.      ALLERGIES:  No Known Allergies    FAMILY HISTORY:  History reviewed. No pertinent family history.    SOCIAL HISTORY:   Social History     Socioeconomic History     Marital status: Single       VITALS:  /68   Pulse 85   Temp 98.5  F (36.9  C) (Temporal)   Resp 16   Wt 100 kg (220 lb 7.4 oz)   SpO2 99%     PHYSICAL EXAM    Constitutional:  Alert, in no acute distress. Cooperative.  EYES: Conjunctivae clear.  HENT:  Atraumatic, normocephalic.  Respiratory:  Respirations even, unlabored, in no acute respiratory distress.  Cardiovascular:  Regular rate, good peripheral perfusion.  GI: Soft, flat, non-distended.  Musculoskeletal: Bilateral lower extremities: abrasion to dorsum of right foot. Bilateral plantar aspect of feet appear moist/wrinkled, no current sloughing or evidence of necrosis. No overlying erythema, warmth, purulence, fluctuance, edema, ecchymosis, crepitus, or obvious bony deformity. No obvious joint laxity or effusion. Full ROM without pain. Neurovascularly intact distally. Cap refill <2 seconds. 2+ DP and PT pulses bilaterally. 5/5 strength. Compartments soft. Thickened toenails, especially left great toe.   Integument: Warm, Dry.   Neurologic:  Alert & oriented. No focal deficits noted.  Psych: Normal mood and affect.      LAB:  All pertinent labs reviewed and interpreted.       RADIOLOGY:  Reviewed all pertinent imaging. Please see official radiology report.  No orders to display     Bailey MISTRY, am serving as a scribe to document services personally performed by Socorro Medina PA-C based on my observation and the provider's statements to me. ISocorro PA-C, attest that Bailey James is acting in a scribe capacity, has observed my performance of the services and has documented them in accordance with my direction.    Socorro Medina PA-C  [facility]  EMERGENCY DEPARTMENT  [address] [place] [id number]  [phone]      Socorro Medina PA-C  07/06/23 [id number]     needle (32G X 4 MM) 32G X 4 MM miscellaneous     metolazone (ZAROXOLYN) 2.5 MG tablet     montelukast (SINGULAIR) 10 MG tablet     pantoprazole (PROTONIX) 40 MG EC tablet     potassium chloride ER (KLOR-CON M) 10 MEQ CR tablet     predniSONE (DELTASONE) 5 MG tablet     traMADol (ULTRAM) 50 MG tablet     umeclidinium (INCRUSE ELLIPTA) 62.5 MCG/INH oral inhaler     warfarin ANTICOAGULANT (COUMADIN) 5 MG tablet     zinc sulfate (ZINCATE) 220 (50 Zn) MG capsule     Current Facility-Administered Medications   Medication     methylPREDNISolone (DEPO-MEDROL) injection 20 mg     methylPREDNISolone (DEPO-MEDROL) injection 20 mg     triamcinolone (KENALOG-40) injection 40 mg     ROS:   Constitutional: No fever, chills, or sweats.   ENT: No visual disturbance, ear ache, epistaxis, sore throat.   Allergies/Immunologic: Negative.   Respiratory: No cough, hemoptysis.   Cardiovascular: As per HPI.   GI: No nausea, vomiting, hematemesis, melena, or hematochezia.   : No urinary frequency, dysuria, or hematuria.   Integument: Negative.   Psychiatric: Pleasant, no major depression noted  Neuro: No focal neurological deficits noted  Endocrinology: Negative.   Musculoskeletal: As per HPI.      EXAM:  GENERAL: Appears comfortable, in no acute distress.   HEENT: Eye symmetrical, no discharge or icterus bilaterally. Mucous membranes moist and without lesions.  CV:  Unable to auscultate  RESPIRATORY: Unable to auscultate  GI: No distention noted  EXTREMITIES:  Trace lower extremity edema  NEUROLOGIC: Alert and interacting appropriately. No focal deficits.   MUSCULOSKELETAL: No joint swelling or tenderness.   SKIN: No jaundice. No rashes or lesions. Driveline dressing c     Labs:  Lab Results   Component Value Date    WBC 8.2 12/11/2020    HGB 10.7 (L) 12/11/2020    HCT 34.6 (L) 12/11/2020     12/11/2020     12/11/2020    POTASSIUM 3.7 12/11/2020    CHLORIDE 96 12/11/2020    CO2 31 12/11/2020    BUN 50 (H) 12/11/2020    CR  2.16 (H) 12/11/2020     (H) 12/11/2020    SED 10 10/27/2020    DD 0.4 04/01/2020    NTBNPI 1,162 (H) 08/05/2020    NTBNP 866 (H) 07/31/2019    TROPI 0.018 08/05/2020    AST 45 12/11/2020    ALT 51 12/11/2020    ALKPHOS 135 12/11/2020    BILITOTAL 0.7 12/11/2020    INR 1.99 (H) 12/11/2020 8/5/20 RHC    Time  Systolic  Diastolic  Mean  A Wave  V Wave  EDP  Max dp/dt  HR    RA Pressures   3:41 PM      6 mmHg     7 mmHg     7 mmHg         107 bpm       RV Pressures   3:42 PM  24 mmHg             6 mmHg       153 bpm       PA Pressures   3:43 PM  23 mmHg     11 mmHg     15 mmHg             156 bpm       PCW Pressures   3:42 PM      4 mmHg     4 mmHg     5 mmHg         153 bpm          Cardiac Output Results   3:32 PM  4.03 L/min     1.83 L/min/m2     4.05 L/min     1.84 L/min/m2            3:43 PM  4.03 L/min                     7/15/2020 ICD check  In clinic device appointment conversion to remote device appointment to minimize COVID19 exposure for high risk patient populations.  Scheduled remote ICD transmission received and reviewed. Device transmission sent per MD orders. Patient has a Medtronic multi lead ICD programmed AAIR. Normal ICD function. No episodes recorded. No arrhythmias recorded. Presenting EGM = SR @ 70 bpm. AP = 0%.  = 0%. BVP = 0%. OptiVol fluid index is at baseline. Estimated battery longevity to MARVA = 6 months. Battery voltage = 2.88V. No short v-v intervals recorded. Lead impedance trends appear stable. Patient notified of interrogation results. Patient reports that he is feeling well and denies complaints. Plan for patient to send a remote transmission in 3 months as scheduled.  MIKY Feliz RN     8/5/20 ECHO   LVAD cannula was seen in the expected anatomic position in the LV apex.  HM2 at 9400RPM.  LVIDd 61mm.  Septum normal.  Aortic valve remain closed.  Normal flow velocities.  Global right ventricular function is mildly to moderately reduced.  The inferior vena cava is normal.  No  pericardial effusion is present.     1/20/2020 EHCOInterpretation Summary  Technically difficult study. Patients heart rate is in the 120s-150s during  the study.  HM2 LVAD is present and is funcioning at 9400RPM  Severely (EF 10-15%) reduced left ventricular function is present. Severe left  ventricular dilation is present. LVIDd is 6.6cm LVAD cannula in the apex is  not well visualized. Inflow and outflow velocities could not be obtained.  Aortic valve appears to open minimally with each beat. Septum is probably  midline but again difficult to visualize.  Global right ventricular function is moderately to severely reduced.  IVC diameter >2.1 cm collapsing <50% with sniff suggests a high RA pressure  estimated at 15 mmHg or greater. No pericardial effusion is present.     Compared to prior study on 1/16/20, LVIDd appears slightly larger. Patient is  severely tachycardic now. IVC appears more dilated.     7/24/2019 RHC    Time  Systolic  Diastolic  Mean  A Wave  V Wave  EDP  Max dp/dt  HR    RA Pressures  10:04 AM      10 mmHg     52546 mmHg     53864 mmHg         55 bpm       RV Pressures   9:42 AM              384 mmHg/sec           10:01 AM  25 mmHg             10 mmHg       39 bpm       PA Pressures  10:01 AM  25 mmHg     16 mmHg     19 mmHg             80 bpm       PCW Pressures  10:02 AM      11 mmHg     51099 mmHg     43550 mmHg         68 bpm            Time  TDCO  TDCI  Kee C.O.  Kee C.I.  Kee HR    Cardiac Output Results   9:42 AM  4.63 L/min     2.02 L/min/m2     4.62 L/min     2.01 L/min/m2               _____________________________________________________________________________     Assessment and Plan:   Jim Willingham is a 61 year old male with a past medical history of NICM (EF 20%) s/p HM II LVAD placement (6/19/17) at [facility] (ob[facility]) Other relavant history includes chronic kidney disease stage III, diabetes mellitus type 2, RV failure, CECILIA, obesity, hypertension, COPD, asthma. He presents for LVAD  f/u urgently following ICD shock.     Overall LVAD parameters and weight trends are satisfactory catheterization with echo reviewed recently.  Patient what he is telling me that the ICD shock and the available data I do believe that he is clinically on the dry side.  His potassium was a little bit low but I do not think it was low enough to explain VF arrest.  He could have had a silent event.  At this time as we discussed that he is going to skip the Bumex dose tonight and then he will start taking Bumex 3 mg twice daily.  We will give a little bit of extra potassium 20 mEq in the morning and continue with 20 mEq in the afternoon.  He will continue the amiodarone and other medications with no changes.  LVH was stable.  I have very low suspicion for any LVAD malfunction or any LVAD thrombus.  I do think it probably was a volume related issue and shock.     #  Chronic systolic heart failure secondary to NICM  Stage D  NYHA Class IIIA  ACEi/ARB: Losartan stopped d/t fluctuating renal function, continue hydralazine.  BB: Stopped during recent admission  Aldosterone antagonist: Stopped d/t hyperkalemia.  SCD prophylaxis: ICD in place, most recent appropriate shock on 12/11/2020  Fluid status:   Appears hypervolemic today management as above    #  S/P LVAD implant inititally as [facility] due to obesity, now within goal BMI range and being evuated for transplant  Anticoagulation: Warfarin with INR goal of 2-3,  dosing per coumadin clinic  Antiplatelet: Aspirin 81 mg daily  MAP: Goal 65-85  LDH: Stable, 391, BL around 380-450     VAD Interrogation on 11/4/2020. VAD interrogation reviewed with VAD coordinator. Agree with findings. Occasional PI events. No power spikes, speed drops, or other findings suspicious of pump malfunction noted.      # RV failure  - Now off metoprolol, continue amidoarone  - Ongoing transplant evaluation     # Obesity:  Recommend weight loss with a goal weight of 255 which he has now met. Continue  additional weight loss as able     # A. Fib  New in Jan. Now S/p Cardioversion in 5/2020. Symptoms improved dramatically. In sinus on last ICD check. No a. Fib episodes since cardioversion  - Continue amiodarone and coumadin     # CECILIA: Continue CPAP.       # Chronic kidney disease: Significantly fluctuating creatinine however increasing trend recently.  I think this is probably related to diuretics and being allergic on the dry side.  Management as above we will repeat basic metabolic panel on Monday and then he has an appointment on Friday.  Repeat BMP.     # Iron deficiency anemia  He did receive IV iron infusions.  We will continue to monitor closely.     Follow-up  - Needs PFT and 6 min walk for transplant eval  - RTC in December as scheduled that is this upcoming Friday    I appreciate the opportunity to participate in the care of Jim Willingham . Please do not hesitate to contact me with any further questions.      Sincerely,      Nicola Seth MD     [facility] Division of Cardiology

## 2023-07-06 NOTE — DISCHARGE INSTRUCTIONS
You were seen in the ER for bilateral ankle pain. Your abrasion was treated with antibiotic ointment and a bandage.     Please keep your feet as clean and dry as possible. It is important that you take your socks and ACE wraps off frequently throughout the day to air out your feet and keep them dry. Only clean water, no dirty water - no swimming pools, lakes, rivers, hot tubs, etc.     Please follow-up with your primary care provider tomorrow or early next week for reevaluation and foot check.    Return to the ER if any new or worsening symptoms develop including increased redness, warmth, swelling, puslike drainage, fever/chills, red streaking up your extremity, cold extremity, decreased sensation, new weakness, or any other concerning symptoms.

## 2023-07-06 NOTE — ED TRIAGE NOTES
He comes in by EMS for pain in his feet and ankles. He was found in someone's yard by law enforcement today. He says he fell two weeks ago and today. He does not seem to give clear answers.

## 2023-07-07 VITALS
HEART RATE: 75 BPM | TEMPERATURE: 98.2 F | OXYGEN SATURATION: 97 % | SYSTOLIC BLOOD PRESSURE: 127 MMHG | HEIGHT: 73 IN | BODY MASS INDEX: 27.83 KG/M2 | WEIGHT: 210 LBS | RESPIRATION RATE: 18 BRPM | DIASTOLIC BLOOD PRESSURE: 68 MMHG

## 2023-07-07 NOTE — ED TRIAGE NOTES
Pt sates he sprained both ankles when walking. Pt was discharged at 1900 today. See for same. Ankles at still wrapped with ACE bandages. Pt has steady gait. Pt has pressured speech. Denies HA     Triage Assessment       Row Name 07/06/23 2143       Triage Assessment (Adult)    Airway WDL WDL       Respiratory WDL    Respiratory WDL WDL       Skin Circulation/Temperature WDL    Skin Circulation/Temperature WDL WDL       Peripheral/Neurovascular WDL    Peripheral Neurovascular WDL WDL       Cognitive/Neuro/Behavioral WDL    Cognitive/Neuro/Behavioral WDL WDL

## 2023-07-07 NOTE — ED PROVIDER NOTES
"  Emergency Department Encounter         FINAL IMPRESSION:  Homelessness        ED COURSE AND MEDICAL DECISION MAKING     37-year-old male history of mental health disorder, here with bilateral foot and ankle pain.  Was evaluated previously tonight, never left the department, and rechecked himself in.  States he has no place to go.  Mental health otherwise stable.  No SI or HI.  No obvious hallucinations or responding to any internal stimuli.    On arrival his repeat vitals were normal.  He looks well.  Tired.  Nontoxic.  Examination of the ankles and feet are well-appearing.  Bacitracin was placed on a small abrasion prior.  No other indication for further work-up.  Attempted to have patient brought down to one of the homeless shelters in [place] however he declined stating that he would rather be discharged from the department here and \"figure things out.\"  He otherwise is stable on his feet, seems to be mentating appropriately, seems to be able to make his own decisions, he was discharged    12:09 AM I met with the patient for the initial interview and physical examination. Discussed plan for treatment and workup in the ED.              Medical Decision Making    History:    Supplemental history from: Documented in chart, if applicable    External Record(s) reviewed: Documented in chart, if applicable. and Inpatient Record: [facility] Emergency Department (7/6/23)    Work Up:    Chart documentation includes differential considered and any EKGs or imaging independently interpreted by provider, where specified.    In additional to work up documented, I considered the following work up: Documented in chart, if applicable.    External consultation:    Discussion of management with another provider: Documented in chart, if applicable    Complicating factors:    Care impacted by chronic illness: N/A    Care affected by social determinants of health: Housing Insecurity    Disposition considerations: Discharge. No " "recommendations on prescription strength medication(s). See documentation for any additional details.                Critical Care     Performed by: Pranav Fuller or    Authorized by: Pranav Fuller  Total critical care time:  minutes  Critical care was necessary to treat or prevent imminent or life-threatening deterioration of the following conditions:   Critical care was time spent personally by me on the following activities: development of treatment plan with patient or surrogate, discussions with consultants, examination of patient, evaluation of patient's response to treatment, obtaining history from patient or surrogate, ordering and performing treatments and interventions, ordering and review of laboratory studies, ordering and review of radiographic studies, re-evaluation of patient's condition and monitoring for potential decompensation.  Critical care time was exclusive of separately billable procedures and treating other patients.'    At the conclusion of the encounter I discussed the results of all the tests and the disposition. The questions were answered. The patient or family acknowledged understanding and was agreeable with the care plan.                  MEDICATIONS GIVEN IN THE EMERGENCY DEPARTMENT:  Medications - No data to display    NEW PRESCRIPTIONS STARTED AT TODAY'S ED VISIT:  New Prescriptions    No medications on file       HPI     Patient information obtained from: Patient     Use of Interpretor: N/A     Austin Ramirez is a 37 year old male with no contributory history who presents to this ED via private vehicle for evaluation of ankle pain.     Per chart review, patient was evaluated at [facility] Emergency Department on 7/6/23 for ankle pain. Patient declines any work-up for ankle pain including laboratories CBC, BMP, Mg, CK, or imaging including XR and/or US and only requests \"pop\" and \"rest\" and \"shelter.\" Patient was instructed for return precautions. Patient discharged.     Patient reports " "bilateral ankle pain for the past two weeks after he had twisted both ankles while running. He states that he had walked here from [place] and wants soda and a place to rest. Patient denies additional symptoms or complaints at this time.          REVIEW OF SYSTEMS:  Review of Systems   Constitutional: Negative for fever, malaise  HEENT: Negative runny nose, sore throat, ear pain, neck pain  Respiratory: Negative for shortness of breath, cough, congestion  Cardiovascular: Negative for chest pain, leg edema  Gastrointestinal: Negative for abdominal distention, abdominal pain, constipation, vomiting, nausea, diarrhea  Genitourinary: Negative for dysuria and hematuria.   Integument: Negative for rash, skin breakdown  Neurological: Negative for paresthesias, weakness, headache.  Musculoskeletal: Negative for joint pain, joint swelling. Positive for ankle pain (bilateral)      All other systems reviewed and are negative.          MEDICAL HISTORY     History reviewed. No pertinent past medical history.    History reviewed. No pertinent surgical history.    Social History     Tobacco Use     Smoking status: Every Day     Types: Cigarettes     Tobacco comments:     \"Two cigarettes a day\"       No current outpatient medications on file.          PHYSICAL EXAM     /89   Pulse 91   Temp 98.2  F (36.8  C) (Temporal)   Resp 18   Ht 1.854 m (6' 1\")   Wt 95.3 kg (210 lb)   SpO2 97%   BMI 27.71 kg/m        PHYSICAL EXAM:     General: Patient appears well, nontoxic, comfortable  HEENT: Moist mucous membranes,  No head trauma.    Cardiovascular: Normal rate, normal rhythm, no extremity edema.  No appreciable murmur.  Respiratory: No signs of respiratory distress, lungs are clear to auscultation bilaterally with no wheezes rhonchi or rales.  Abdominal: Soft, nontender, nondistended, no palpable masses, no guarding, no rebound  Musculoskeletal: F Ace wrap taken down, bilateral feet with multiple areas of calluses with no " obvious infection.  Normal compartments.  Normal pulses.  No obvious deformities or significant pain to palpation.  Neurological: Alert and oriented, grossly neurologically intact.  Psychological: Normal affect and mood.  Integument: No rashes appreciated        RESULTS       Labs Ordered and Resulted from Time of ED Arrival to Time of ED Departure - No data to display    No orders to display                     PROCEDURES:  Procedures:  Procedures       IGiovanny am serving as a scribe to document services personally performed by Pranav Fuller DO, based on my observations and the provider's statements to me.  I, Pranav Fuller DO, attest that Giovanny Hughes is acting in a scribe capacity, has observed my performance of the services and has documented them in accordance with my direction.    Pranav Fuller DO  Emergency Medicine  [facility] EMERGENCY DEPARTMENT     Pranav Fuller DO  07/07/23 0046

## 2023-07-07 NOTE — ED NOTES
"Offered taxi ride for pt to go to [facility], pt states \"I don't want to go there.\" Pt educated that other locations are closed at this time.   Pt states \"just discharge me outside.\"  MD notified.   Pt offered food, declines. Pt given soda.   "

## 2023-07-24 ENCOUNTER — APPOINTMENT (OUTPATIENT)
Dept: RADIOLOGY | Facility: HOSPITAL | Age: 38
End: 2023-07-24
Attending: EMERGENCY MEDICINE
Payer: COMMERCIAL

## 2023-07-24 ENCOUNTER — HOSPITAL ENCOUNTER (EMERGENCY)
Facility: HOSPITAL | Age: 38
Discharge: HOME | End: 2023-07-25
Attending: EMERGENCY MEDICINE | Admitting: EMERGENCY MEDICINE
Payer: COMMERCIAL

## 2023-07-24 DIAGNOSIS — M25.571 ACUTE BILATERAL ANKLE PAIN: ICD-10-CM

## 2023-07-24 DIAGNOSIS — M25.572 ACUTE BILATERAL ANKLE PAIN: ICD-10-CM

## 2023-07-24 PROCEDURE — 99284 EMERGENCY DEPT VISIT MOD MDM: CPT

## 2023-07-24 PROCEDURE — 73610 X-RAY EXAM OF ANKLE: CPT | Mod: 50

## 2023-07-24 ASSESSMENT — ACTIVITIES OF DAILY LIVING (ADL): ADLS_ACUITY_SCORE: 33

## 2023-07-24 NOTE — ED TRIAGE NOTES
"The pt presents for evaluation of reported bilateral ankle pain for two weeks. He was brought in by [facility]. The original call per EMS was a \"person down.\" He was found wandering a neighborhood in [facility]. PD knows him well, and reports he was recently evicted from his residence, where he lived with his parents. Parents are unwilling to take him back. He was recently in a chemical dependency treatment but eloped 2 weeks ago and has refused to return. PD attempted to bring him to a homeless shelter but he refused, then began complaining of ankle pain. EMS was concerned, as he appears to be a vulnerable adult, however according to his mother, he is his own decision maker.         "

## 2023-07-25 ENCOUNTER — HOSPITAL ENCOUNTER (EMERGENCY)
Facility: CLINIC | Age: 38
Discharge: HOME | End: 2023-07-25
Attending: EMERGENCY MEDICINE | Admitting: EMERGENCY MEDICINE
Payer: COMMERCIAL

## 2023-07-25 VITALS
DIASTOLIC BLOOD PRESSURE: 78 MMHG | TEMPERATURE: 97.7 F | RESPIRATION RATE: 18 BRPM | SYSTOLIC BLOOD PRESSURE: 143 MMHG | OXYGEN SATURATION: 98 % | HEART RATE: 89 BPM

## 2023-07-25 VITALS
WEIGHT: 205 LBS | SYSTOLIC BLOOD PRESSURE: 118 MMHG | DIASTOLIC BLOOD PRESSURE: 87 MMHG | HEART RATE: 101 BPM | TEMPERATURE: 98.6 F | BODY MASS INDEX: 27.05 KG/M2 | OXYGEN SATURATION: 99 % | RESPIRATION RATE: 16 BRPM

## 2023-07-25 DIAGNOSIS — F32.A DEPRESSION, UNSPECIFIED DEPRESSION TYPE: ICD-10-CM

## 2023-07-25 LAB
AMPHETAMINES UR QL SCN: NORMAL
ANION GAP SERPL CALCULATED.3IONS-SCNC: 11 MMOL/L (ref 7–15)
BARBITURATES UR QL SCN: NORMAL
BASOPHILS # BLD AUTO: 0.1 10E3/UL (ref 0–0.2)
BASOPHILS NFR BLD AUTO: 1 %
BENZODIAZ UR QL SCN: NORMAL
BUN SERPL-MCNC: 13.9 MG/DL (ref 6–20)
BZE UR QL SCN: NORMAL
CALCIUM SERPL-MCNC: 9.9 MG/DL (ref 8.6–10)
CANNABINOIDS UR QL SCN: NORMAL
CHLORIDE SERPL-SCNC: 100 MMOL/L (ref 98–107)
CREAT SERPL-MCNC: 0.95 MG/DL (ref 0.67–1.17)
DEPRECATED HCO3 PLAS-SCNC: 28 MMOL/L (ref 22–29)
EOSINOPHIL # BLD AUTO: 0.4 10E3/UL (ref 0–0.7)
EOSINOPHIL NFR BLD AUTO: 5 %
ERYTHROCYTE [DISTWIDTH] IN BLOOD BY AUTOMATED COUNT: 12.4 % (ref 10–15)
ETHANOL SERPL-MCNC: <0.01 G/DL
GFR SERPL CREATININE-BSD FRML MDRD: >90 ML/MIN/1.73M2
GLUCOSE SERPL-MCNC: 92 MG/DL (ref 70–99)
HCT VFR BLD AUTO: 49.7 % (ref 40–53)
HGB BLD-MCNC: 16.7 G/DL (ref 13.3–17.7)
HOLD SPECIMEN: NORMAL
IMM GRANULOCYTES # BLD: 0.1 10E3/UL
IMM GRANULOCYTES NFR BLD: 1 %
LYMPHOCYTES # BLD AUTO: 2.4 10E3/UL (ref 0.8–5.3)
LYMPHOCYTES NFR BLD AUTO: 33 %
MCH RBC QN AUTO: 29.3 PG (ref 26.5–33)
MCHC RBC AUTO-ENTMCNC: 33.6 G/DL (ref 31.5–36.5)
MCV RBC AUTO: 87 FL (ref 78–100)
MONOCYTES # BLD AUTO: 0.5 10E3/UL (ref 0–1.3)
MONOCYTES NFR BLD AUTO: 7 %
NEUTROPHILS # BLD AUTO: 3.8 10E3/UL (ref 1.6–8.3)
NEUTROPHILS NFR BLD AUTO: 53 %
NRBC # BLD AUTO: 0 10E3/UL
NRBC BLD AUTO-RTO: 0 /100
OPIATES UR QL SCN: NORMAL
PLATELET # BLD AUTO: 209 10E3/UL (ref 150–450)
POTASSIUM SERPL-SCNC: 4 MMOL/L (ref 3.4–5.3)
RBC # BLD AUTO: 5.7 10E6/UL (ref 4.4–5.9)
SODIUM SERPL-SCNC: 139 MMOL/L (ref 136–145)
WBC # BLD AUTO: 7.3 10E3/UL (ref 4–11)

## 2023-07-25 PROCEDURE — 36415 COLL VENOUS BLD VENIPUNCTURE: CPT | Performed by: EMERGENCY MEDICINE

## 2023-07-25 PROCEDURE — 85025 COMPLETE CBC W/AUTO DIFF WBC: CPT | Performed by: EMERGENCY MEDICINE

## 2023-07-25 PROCEDURE — 80307 DRUG TEST PRSMV CHEM ANLYZR: CPT | Performed by: EMERGENCY MEDICINE

## 2023-07-25 PROCEDURE — 82077 ASSAY SPEC XCP UR&BREATH IA: CPT | Performed by: EMERGENCY MEDICINE

## 2023-07-25 PROCEDURE — 90791 PSYCH DIAGNOSTIC EVALUATION: CPT

## 2023-07-25 PROCEDURE — 82310 ASSAY OF CALCIUM: CPT | Performed by: EMERGENCY MEDICINE

## 2023-07-25 PROCEDURE — 99283 EMERGENCY DEPT VISIT LOW MDM: CPT

## 2023-07-25 ASSESSMENT — ACTIVITIES OF DAILY LIVING (ADL)
ADLS_ACUITY_SCORE: 35
ADLS_ACUITY_SCORE: 35

## 2023-07-25 NOTE — ED NOTES
Patient changed into behavioral scrubs. Belongings removed from the patient's possession. Security made aware of CHERYL.

## 2023-07-25 NOTE — ED TRIAGE NOTES
Arrives on transport hold by EMS. Patient was found wandering the [address] on [address]. Patient is difficult to illicit information. Reports SI with plan to be death by .

## 2023-07-25 NOTE — ED PROVIDER NOTES
EMERGENCY DEPARTMENT ENCOUnter      NAME: Delroy Ramirez  AGE: 38 year old male  YOB: 1985  MRN: [id number]  EVALUATION DATE & TIME: 2023 10:39 PM    PCP: No Ref-Primary, Physician    ED PROVIDER: Kemal Almazan DO      Chief Complaint   Patient presents with    Ankle Pain         FINAL IMPRESSION:  1. Acute bilateral ankle pain          ED COURSE & MEDICAL DECISION MAKIN:00 PM I met with the patient in hallway room F and performed my initial interview and exam  12:47 AM I updated the patient with imaging results and discussed the plan for discharge.          The patient presented to the emergency department today complaining of bilateral ankle pain.  He has been seen here many times for similar symptoms in the past.  He states that he fell but is not able to provide any additional details.  No concerning findings on physical exam.  X-rays today were obtained which were normal.  He was informed of the x-ray results and was comfortable with the plan for discharge home.  He was able to safely ambulate.          Medical Decision Making    History:  Supplemental history from: Documented in chart, if applicable  External Record(s) reviewed: Documented in chart, if applicable. and Other: ED visit on 23    Work Up:  Chart documentation includes differential considered and any EKGs or imaging independently interpreted by provider, where specified.  In additional to work up documented, I considered the following work up: Documented in chart, if applicable.    External consultation:  Discussion of management with another provider: Documented in chart, if applicable    Complicating factors:  Care impacted by chronic illness: Mental Health and Smoking / Nicotine Use  Care affected by social determinants of health: Access to Medical Care, Alcohol Abuse and/or Recreational Drug Use, Housing Insecurity, Low Income, No Support for Care at Home, and Problems Related to Primary Support  "Group    Disposition considerations: Discharge. No recommendations on prescription strength medication(s). See documentation for any additional details.        At the conclusion of the encounter I discussed the results of all of the tests and the disposition. The questions were answered. The patient or family acknowledged understanding and was agreeable with the care plan.       =================================================================    HPI        Delroy Ramirez is a 38 year old male with a pertinent history of antisocial personality disorder, methamphetamine use, psychosis, and homelessness who presents to this ED via EMS for evaluation of ankle pain    Per chart review: The patient was seen in this ED on 7/6/23 for ankle pain and homelessness. The patient complained of bilateral ankle pain \"from walking\" and denied any trauma or falls. The patient reported homelessness and that he has nowhere else to go. It was attempted to transfer the patient to a homeless shelter, but the patient declined to go. The patient was discharged in stable condition.     The patient reports bilateral ankle pain . The patient initially said he fell two weeks ago, but then changed his answer to two hours ago. The patient denies arm pain, chest pain, knee pain, or other injuries.     Per triage note: The patient complains of bilateral ankle pain for two weeks. He was brought in by [facility] after being found wandering a neighborhood in [facility]. PD knows him well, and reports he was recently evicted from his residence, where he lived with his parents. The patient's parents are unwilling to take him back. He was recently in a chemical dependency treatment but eloped 2 weeks ago and has refused to return. PD attempted to bring him to a homeless shelter but he refused, then began complaining of ankle pain. EMS was concerned, as he appears to be a vulnerable adult, however according to his mother, he is his own decision maker.     PAST " "MEDICAL HISTORY:  No past medical history on file.    PAST SURGICAL HISTORY:  No past surgical history on file.        CURRENT MEDICATIONS:    buprenorphine-naloxone (SUBOXONE) 8-2 MG SUBL sublingual tablet  cloNIDine (CATAPRES) 0.1 MG tablet  mirtazapine (REMERON) 15 MG tablet  naloxone (NARCAN) 4 MG/0.1ML nasal spray  OLANZapine (ZYPREXA) 5 MG tablet  ondansetron (ZOFRAN ODT) 4 MG ODT tab  risperiDONE (RISPERDAL) 2 MG tablet        ALLERGIES:  No Known Allergies    FAMILY HISTORY:  No family history on file.    SOCIAL HISTORY:   Social History     Socioeconomic History    Marital status: Single   Tobacco Use    Smoking status: Every Day     Types: Cigarettes    Smokeless tobacco: Never    Tobacco comments:     \"Two cigarettes a day\"   Substance and Sexual Activity    Alcohol use: Never    Drug use: Never   Social History Narrative    ** Merged History Encounter **         ** Merged History Encounter **            VITALS:  Patient Vitals for the past 24 hrs:   BP Temp Temp src Pulse Resp SpO2 Weight   07/25/23 0100 118/87 -- -- -- 16 99 % --   07/24/23 1854 121/86 98.6  F (37  C) Temporal 101 18 98 % 93 kg (205 lb)       PHYSICAL EXAM    VITAL SIGNS: /87   Pulse 101   Temp 98.6  F (37  C) (Temporal)   Resp 16   Wt 93 kg (205 lb)   SpO2 99%   BMI 27.05 kg/m     Constitutional:  Well developed, Well nourished,  HENT:  Normocephalic, Atraumatic, Oropharynx moist, Nose normal.   Neck:  Normal range of motion, Supple, No stridor.   Eyes:  EOMI, Conjunctiva normal, No discharge.   Respiratory:  Breathing comfortably, No respiratory distress,    Cardiovascular:  Normal pulses   Musculoskeletal:  No edema or cyanosis, no deformities. No tenderness or pain with range of motion at either ankle, no evidence of traumatic injury  Integument:  Dry, No erythema, No rash.  Neurologic:  Alert & oriented x 3, No obvious focal deficits noted.           RADIOLOGY:  I have independently reviewed and interpreted the above " imaging, pending the final radiology read.  XR Ankle Left 3 Views   Final Result   IMPRESSION: Normal left ankle joint spaces and alignment. No fracture.      XR Ankle Right 3 Views   Final Result   IMPRESSION: Normal right ankle joint spaces and alignment. No fracture.            I, Bailey James, am serving as a scribe to document services personally performed by Dr. Almazan based on my observation and the provider's statements to me. I, Kemal Almazan, DO attest that Bailey James is acting in a scribe capacity, has observed my performance of the services and has documented them in accordance with my direction.    Kemal Almazan DO  Emergency Medicine  [facility] EMERGENCY DEPARTMENT  [facility] [facility] [id number] [phone]  Dept: [phone]     Kemal Almazan MD  07/25/23 0223

## 2023-07-25 NOTE — ED PROVIDER NOTES
"  History     Chief Complaint:  Ankle Pain and Suicidal       The history is provided by the patient.      Delroy Ramirez is a 38 year old male who presents with ankle pain.  This is a chronic issue.  No recent falls or trauma.  He has been seen numerous times and has had x-rays that were unremarkable.  The patient is homeless.  He has been seen numerous times recently related to homelessness as well and has been given outpatient resources.  He reportedly was apprehended by police today.  He says that he did a \"dine and dash\" prior to being apprehended.  Reportedly he endorsed depression so he was placed on a hold and brought to the ED.  On arrival here the patient adamantly denies being suicidal to me.  He denies any recent attempt to hurt himself.  He says he was drinking some alcohol earlier today and use cigarettes.  He denies any other substance abuse today.  He says all he wants to do is go to see his brother-in-law in [place].      Independent Historian:   EMS - They report per HPI    Review of External Notes:   [facility] ER note reviewed from yesterday when the patient was seen for ankle pain.      Medications:    Buprenorphine-naloxone   Clonidine   Mirtazapine   Naloxone   Olanazpine   Ondansetron   Risperidone     Past Medical History:    Anxiety     Past Surgical History:    Wrist surgery     Physical Exam   Patient Vitals for the past 24 hrs:   BP Temp Temp src Pulse Resp SpO2   07/25/23 1550 -- -- -- 89 -- 98 %   07/25/23 1548 (!) 143/78 97.7  F (36.5  C) Oral -- 18 98 %        Physical Exam  Constitutional:       General: He is not in acute distress.     Appearance: Normal appearance. He is not toxic-appearing.      Comments: Disheveled   HENT:      Head: Atraumatic.   Eyes:      General: No scleral icterus.     Conjunctiva/sclera: Conjunctivae normal.   Cardiovascular:      Rate and Rhythm: Normal rate and regular rhythm.      Heart sounds: Normal heart sounds.   Pulmonary:      Effort: " Pulmonary effort is normal. No respiratory distress.      Breath sounds: Normal breath sounds.   Abdominal:      General: There is no distension.      Palpations: Abdomen is soft.      Tenderness: There is no abdominal tenderness.   Musculoskeletal:         General: No deformity.      Cervical back: Neck supple.      Right lower leg: No edema.      Left lower leg: No edema.      Comments: No ankle edema, erythema, or tenderness.   Skin:     General: Skin is warm.      Capillary Refill: Capillary refill takes less than 2 seconds.   Neurological:      Mental Status: He is alert.      Comments: Oriented to person and month although he was not aware what city he is in.   Psychiatric:      Comments: Cooperative.           Emergency Department Course     Laboratory:  Labs Ordered and Resulted from Time of ED Arrival to Time of ED Departure   DRUG ABUSE SCREEN 1 URINE (ED) - Normal       Result Value    Amphetamines Urine Screen Negative      Barbituates Urine Screen Negative      Benzodiazepine Urine Screen Negative      Cannabinoids Urine Screen Negative      Cocaine Urine Screen Negative      Opiates Urine Screen Negative     BASIC METABOLIC PANEL - Normal    Sodium 139      Potassium 4.0      Chloride 100      Carbon Dioxide (CO2) 28      Anion Gap 11      Urea Nitrogen 13.9      Creatinine 0.95      Calcium 9.9      Glucose 92      GFR Estimate >90     ETHYL ALCOHOL LEVEL - Normal    Alcohol ethyl <0.01     CBC WITH PLATELETS AND DIFFERENTIAL    WBC Count 7.3      RBC Count 5.70      Hemoglobin 16.7      Hematocrit 49.7      MCV 87      MCH 29.3      MCHC 33.6      RDW 12.4      Platelet Count 209      % Neutrophils 53      % Lymphocytes 33      % Monocytes 7      % Eosinophils 5      % Basophils 1      % Immature Granulocytes 1      NRBCs per 100 WBC 0      Absolute Neutrophils 3.8      Absolute Lymphocytes 2.4      Absolute Monocytes 0.5      Absolute Eosinophils 0.4      Absolute Basophils 0.1      Absolute  Immature Granulocytes 0.1      Absolute NRBCs 0.0          Emergency Department Course & Assessments:    PSS-3      Date and Time Over the past 2 weeks have you felt down, depressed, or hopeless? Over the past 2 weeks have you had thoughts of killing yourself? Have you ever attempted to kill yourself? When did this last happen? User   07/25/23 1553 yes yes yes within the last month (but not today) EAD          C-SSRS ([place])      Date and Time Q1 Wished to be Dead (Past Month) Q2 Suicidal Thoughts (Past Month) Q3 Suicidal Thought Method Q4 Suicidal Intent without Specific Plan Q5 Suicide Intent with Specific Plan Q6 Suicide Behavior (Lifetime) Within the Past 3 Months? RETIRED: Level of Risk per Screen Screening Not Complete User   07/25/23 1553 no no no no yes yes -- -- -- EAD              Suicide assessment completed by mental health (D.E.C., LCSW, etc.)      Assessments:  1607 I obtained history and examined the patient as noted above.  1755 I spoke with DEC regarding the patient.     Disposition:  The patient was discharged to home.     Impression & Plan      Medical Decision Making:  This patient is a 38-year-old man who was brought with depression.  He says he was drinking some alcohol and was picked up by police.  He is adamant that he is not suicidal.  Urine tox screen is negative.  Lab work-up overall unremarkable.  The patient is very cooperative.  He was seen by the DEC  and continues to deny any suicidal ideation.  He does not meet criteria to be on an involuntary hold.  At this point he will be discharged.  He plans to go stay with his in-laws in [place].      Diagnosis:    ICD-10-CM    1. Depression, unspecified depression type  F32.A              Scribe Disclosure:  I, Billy Ramos, am serving as a scribe at 4:11 PM on 7/25/2023 to document services personally performed by Pranav Rivas MD based on my observations and the provider's statements to me.   7/25/2023   Evelyn  MD Evelyn Gordon Sean Edward, MD  07/25/23 5882

## 2023-07-25 NOTE — CONSULTS
"Diagnostic Evaluation Consultation  Crisis Assessment    Patient Name: Delroy Ramirez  Age:  38 year old  Legal Sex: male  Gender Identity: male  Pronouns:   Race: White  Ethnicity: Not  or   Language: English      Patient was assessed: Virtual: iPad  Patient location: [facility] EMERGENCY DEPT                             ED14    Referral Data and Chief Complaint  Delroy Ramirez presents to the ED via EMS. Patient is presenting to the ED for the following concerns: Suicidal ideation, Substance use, Health stressors.   Factors that make the mental health crisis life threatening or complex are:  homelessness, chronic thoughts of suicide, poor physical health.      Informed Consent and Assessment Methods  Explained the crisis assessment process, including applicable information disclosures and limits to confidentiality, assessed understanding of the process, and obtained consent to proceed with the assessment.  Assessment methods included conducting a formal interview with patient, review of medical records, collaboration with medical staff, and obtaining relevant collateral information from family and community providers when available.  : done     Patient response to interventions: verbalizes understanding  Coping skills were attempted to reduce the crisis:  lack of     History of the Crisis   chronic    Brief Psychosocial History  Family:  Single, Children no  Support System:  Sibling(s)  Employment Status:  unemployed  Source of Income:  unknown  Financial Environmental Concerns:  unemployed  Current Hobbies:     Barriers in Personal Life:  mental health concerns    Significant Clinical History  Current Anxiety Symptoms:     Current Depression/Trauma:  thoughts of death/suicide, impaired decision making  Current Somatic Symptoms:     Current Psychosis/Thought Disturbance:     Current Eating Symptoms:     Chemical Use History:  Alcohol: Binge  Last Use:: 07/25/23 (\"a little " "bit\")  Benzodiazepines: None  Opiates: None  Cocaine: None  Marijuana: Occasional  Last Use::  (unk)  Other Use: Methamphetamines  Last Use::  (\"2 weeks ago\")   Past diagnosis:  Substance Use Disorder, Depression  Family history:  No known history of mental health or chemical health concerns  Past treatment:  Psychiatric Medication Management, Individual therapy  Details of most recent treatment:  pt not forthcoming  Other relevant history:  none       Collateral Information  Is there collateral information: No     Collateral information name, relationship, phone number:       What happened today:       What is different about patient's functioning:       Concern about alcohol/drug use:      What do you think the patient needs:      Has patient made comments about wanting to kill themselves/others:      If d/c is recommended, can they take part in safety/aftercare planning:       Additional collateral information:        Risk Assessment  [place] Suicide Severity Rating Scale Full Clinical Version:  Suicidal Ideation  Q6 Suicide Behavior (Lifetime): yes          [place] Suicide Severity Rating Scale Recent:   Suicidal Ideation (Recent)  Q1 Wished to be Dead (Past Month): no  Q2 Suicidal Thoughts (Past Month): no  Q3 Suicidal Thought Method: no  Q4 Suicidal Intent without Specific Plan: no  Q5 Suicide Intent with Specific Plan: no  Within the Past 3 Months?: no  Level of Risk per Screen: low risk  Intensity of Ideation (Recent)  Most Severe Ideation Rating (Past 1 Month): 1  Description of Most Severe Ideation (Past 1 Month): na  Frequency (Past 1 Month): Less than once a week  Duration (Past 1 Month): Fleeting, few seconds or minutes  Controllability (Past 1 Month): Does not attempt to control thoughts  Deterrents (Past 1 Month): Does not apply  Reasons for Ideation (Past 1 Month): Does not apply  Suicidal Behavior (Recent)  Actual Attempt (Past 3 Months): No  Total Number of Actual Attempts (Past 3 Months): " 0  Actual Attempt Description (Past 3 Months): na  Has subject engaged in non-suicidal self-injurious behavior? (Past 3 Months): No  Interrupted Attempts (Past 3 Months): No  Total Number of Interrupted Attempts (Past 3 Months): 0  Interrupted Attempt Description (Past 3 Months): 0  Aborted or Self-Interrupted Attempt (Past 3 Months): No  Total Number of Aborted or Self-Interrupted Attempts (Past 3 Months): 0  Aborted or Self-Interrupted Attempt Description (Past 3 Months): 0  Preparatory Acts or Behavior (Past 3 Months): No  Total Number of Preparatory Acts (Past 3 Months): 0  Preparatory Acts or Behavior Description (Past 3 Months): na    Environmental or Psychosocial Events: unstable housing, homelessness, unemployment/underemployment, excessive debt, poor finances, ongoing abuse of substances, neither working nor attending school  Protective Factors: Protective Factors: able to access care without barriers    Does the patient have thoughts of harming others? Feels Like Hurting Others: no  Previous Attempt to Hurt Others: no  Is the patient engaging in sexually inappropriate behavior?: no    Is the patient engaging in sexually inappropriate behavior?  no        Mental Status Exam   Affect: Appropriate  Appearance: Appropriate  Attention Span/Concentration: Attentive  Eye Contact: Engaged    Fund of Knowledge: Appropriate   Language /Speech Content: Fluent  Language /Speech Volume: Normal  Language /Speech Rate/Productions: Normal  Recent Memory: Variable  Remote Memory: Variable  Mood: Normal  Orientation to Person: Yes   Orientation to Place: Yes  Orientation to Time of Day: Yes  Orientation to Date: Yes     Situation (Do they understand why they are here?): Yes  Psychomotor Behavior: Normal  Thought Content: Clear  Thought Form: Intact     Mini-Cog Assessment  Number of Words Recalled:    Clock-Drawing Test:     Three Item Recall:    Mini-Cog Total Score:       Medication  Psychotropic medications:   Medication  Orders - Psychiatric (From admission, onward)      None             Current Care Team  Patient Care Team:  No Ref-Primary, Physician as PCP - Franci England, DO as Assigned PCP  No Ref-Primary, Physician  No Ref-Primary, Physician    Diagnosis  Patient Active Problem List   Diagnosis Code    Psychosis (H) F29    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 Pt endorses chronic hx of homelessness, meth abuse, disorganized bx, depressed mood. He does not appear decompensated from baseline. Denies thoughts of suicide when asked directly by this writer. Inquired statements made to PD/EMS that he has SI with plan to die suicide by  - he denies this to writer. Requests to d/c to his [facility] home in [place]. Recommend he abstain from use of substances - pt denies his use to be of concern to him. Pt to d/c from ED. Aware of resources and endorses hx of utilizing them as needed.    Patient coping skills attempted to reduce the crisis:  lack of - this is chronic presentation for pt.     Disposition  Recommended disposition: Substance Abuse Disorder Treatment        Reviewed case and recommendations with attending provider. Attending Name: Evelyn COON       Attending concurs with disposition: yes       Patient and/or validated legal guardian concurs with disposition:   yes       Final disposition:  discharge    Legal status on admission:      Assessment Details   Total duration spent on the patient case in minutes: 10 min     CPT code(s) utilized: Non-Billable    Brigette Bloom [facility], Psychotherapist  [facility] - Triage & Transition Services  Callback: [phone]

## 2023-07-25 NOTE — DISCHARGE INSTRUCTIONS
Fortunately your x-rays today were normal.  Follow-up with your primary care doctor and return to the ER for any worsening symptoms or other concerns.

## 2023-08-12 ENCOUNTER — HOSPITAL ENCOUNTER (EMERGENCY)
Facility: CLINIC | Age: 38
Discharge: HOME | End: 2023-08-12
Attending: EMERGENCY MEDICINE | Admitting: EMERGENCY MEDICINE
Payer: COMMERCIAL

## 2023-08-12 VITALS
SYSTOLIC BLOOD PRESSURE: 136 MMHG | RESPIRATION RATE: 18 BRPM | OXYGEN SATURATION: 95 % | HEIGHT: 74 IN | DIASTOLIC BLOOD PRESSURE: 92 MMHG | BODY MASS INDEX: 26.95 KG/M2 | WEIGHT: 210 LBS | TEMPERATURE: 98.1 F | HEART RATE: 88 BPM

## 2023-08-12 DIAGNOSIS — Z76.5 MALINGERING: ICD-10-CM

## 2023-08-12 DIAGNOSIS — Z59.00 HOMELESSNESS: ICD-10-CM

## 2023-08-12 PROCEDURE — 99283 EMERGENCY DEPT VISIT LOW MDM: CPT

## 2023-08-12 SDOH — ECONOMIC STABILITY - HOUSING INSECURITY: HOMELESSNESS UNSPECIFIED: Z59.00

## 2023-08-13 ENCOUNTER — HOSPITAL ENCOUNTER (EMERGENCY)
Facility: CLINIC | Age: 38
Discharge: HOME | End: 2023-08-13
Attending: EMERGENCY MEDICINE | Admitting: EMERGENCY MEDICINE
Payer: COMMERCIAL

## 2023-08-13 VITALS
DIASTOLIC BLOOD PRESSURE: 83 MMHG | SYSTOLIC BLOOD PRESSURE: 125 MMHG | OXYGEN SATURATION: 97 % | HEART RATE: 98 BPM | RESPIRATION RATE: 18 BRPM | TEMPERATURE: 98 F

## 2023-08-13 DIAGNOSIS — Z65.9 CONCERNED ABOUT HAVING SOCIAL PROBLEM: ICD-10-CM

## 2023-08-13 PROCEDURE — 99282 EMERGENCY DEPT VISIT SF MDM: CPT

## 2023-08-13 NOTE — DISCHARGE INSTRUCTIONS
Discharge Instructions  Mental Health Concerns    You were seen today for mental health concerns, such as depression, anxiety, or suicidal thinking. Your provider feels that you do not require hospitalization at this time. However, your symptoms may become worse, and you may need to return to the Emergency Department. Most treatments of depression and suicidal thoughts are a process rather than a single intervention.  Medications and counseling can take several weeks or more to help.    Generally, every Emergency Department visit should have a follow-up clinic visit with either a primary or a specialty clinic/provider. Please follow-up as instructed by your emergency provider today.    By accepting these discharge instructions:  You promise to not harm yourself or others.  You agree that if you feel you are becoming unable to keep that promise, you will do something to help yourself before you do anything to harm yourself or others.   You agree to keep any safety plan arranged on your visit here today.  You agree to take any medication prescribed or recommended by your provider.  If you are getting worse, you can contact a friend or a family member, contact your counselor or family provider, contact a crisis line, or other options discussed with the provider or therapist today.  At any time, you can call 911 and return to the Emergency Department for more help.  You understand that follow-up is essential to your treatment, and you will make and keep appointments recommended on your visit today.    How to improve your mental health and prevent suicide:  Involve others by letting family, friends, counselors know.  Do not isolate yourself.  Avoid alcohol or drugs. Remove weapons, poisons from your home.  Try to stick to routines for eating, sleeping and getting regular exercise.    Try to get into sunlight. Bright natural light not only treats seasonal affective disorder but also depression.  Increase safe activities  that you enjoy.    If you feel worse, contact 1-800-suicide (1-170.305.5978), or call 911, or your primary provider/counselor for additional assistance.    If you were given a prescription for medicine here today, be sure to read all of the information (including the package insert) that comes with your prescription.  This will include important information about the medicine, its side effects, and any warnings that you need to know about.  The pharmacist who fills the prescription can provide more information and answer questions you may have about the medicine.  If you have questions or concerns that the pharmacist cannot address, please call or return to the Emergency Department.   Remember that you can always come back to the Emergency Department if you are not able to see your regular provider in the amount of time listed above, if you get any new symptoms, or if there is anything that worries you.

## 2023-08-13 NOTE — ED NOTES
"Pt denies pain. Denies medical needs. States \"I would just like some juice, the remote and to hang out for awhile\". Patient [place] airport[place] \"[place] around\" and is homeless.  "

## 2023-08-13 NOTE — ED TRIAGE NOTES
[facility] Patient [facility] police and told them that he had a fall and hurt his bilateral ankles. Ambulates without difficulty. Pt restless with rapid speech. Cooperative. Denies SI/HI. Denies alcohol or drug use today. .
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
## 2023-08-13 NOTE — ED NOTES
Bed: ED29  Expected date:   Expected time:   Means of arrival:   Comments:  Dave 38M fall, ankle injury- possible mental health

## 2023-08-13 NOTE — ED TRIAGE NOTES
Patient discharged last evening, has been malingering in hospital ever since. Wants to check in again.

## 2023-08-13 NOTE — DISCHARGE INSTRUCTIONS
As we have discussed, we did do blood stockings as you have told us that this is what you would like here today.  Please come back to the ER immediately with any other concerns you have or any new symptoms.
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
## 2023-10-07 ENCOUNTER — HOSPITAL ENCOUNTER (EMERGENCY)
Facility: HOSPITAL | Age: 38
Discharge: HOME | End: 2023-10-08
Attending: EMERGENCY MEDICINE | Admitting: EMERGENCY MEDICINE
Payer: COMMERCIAL

## 2023-10-07 DIAGNOSIS — M25.571 CHRONIC ANKLE PAIN, BILATERAL: ICD-10-CM

## 2023-10-07 DIAGNOSIS — G89.29 CHRONIC ANKLE PAIN, BILATERAL: ICD-10-CM

## 2023-10-07 DIAGNOSIS — R60.9 DEPENDENT EDEMA: ICD-10-CM

## 2023-10-07 DIAGNOSIS — M25.572 CHRONIC ANKLE PAIN, BILATERAL: ICD-10-CM

## 2023-10-07 PROBLEM — F20.0 PARANOID SCHIZOPHRENIA (H): Status: ACTIVE | Noted: 2023-08-10

## 2023-10-07 PROCEDURE — 250N000013 HC RX MED GY IP 250 OP 250 PS 637: Performed by: EMERGENCY MEDICINE

## 2023-10-07 PROCEDURE — 99283 EMERGENCY DEPT VISIT LOW MDM: CPT

## 2023-10-07 RX ORDER — IBUPROFEN 600 MG/1
600 TABLET, FILM COATED ORAL ONCE
Status: COMPLETED | OUTPATIENT
Start: 2023-10-07 | End: 2023-10-07

## 2023-10-07 RX ORDER — IBUPROFEN 600 MG/1
600 TABLET, FILM COATED ORAL EVERY 6 HOURS PRN
Qty: 20 TABLET | Refills: 0 | Status: SHIPPED | OUTPATIENT
Start: 2023-10-07

## 2023-10-07 RX ADMIN — IBUPROFEN 600 MG: 600 TABLET, FILM COATED ORAL at 23:38

## 2023-10-07 ASSESSMENT — ENCOUNTER SYMPTOMS
ARTHRALGIAS: 1
JOINT SWELLING: 1

## 2023-10-08 VITALS
SYSTOLIC BLOOD PRESSURE: 141 MMHG | HEART RATE: 91 BPM | DIASTOLIC BLOOD PRESSURE: 94 MMHG | OXYGEN SATURATION: 100 % | TEMPERATURE: 97 F | RESPIRATION RATE: 20 BRPM

## 2023-10-08 NOTE — ED PROVIDER NOTES
NAME: Delroy Ramirez  AGE: 38 year old male  YOB: 1985  MRN: [id number]  EVALUATION DATE & TIME: 10/7/2023 11:05 PM    PCP: No Ref-Primary, Physician    ED PROVIDER: Taras Jj M.D.      Chief Complaint   Patient presents with    Ankle Pain     FINAL IMPRESSION:  1. Dependent edema    2. Chronic ankle pain, bilateral      MEDICAL DECISION MAKIN:11 PM I met with the patient, obtained history, performed an initial exam, and discussed options and plan for diagnostics and treatment here in the ED.   12:03 AM I discussed the plan for discharge with the patient, and patient is agreeable. We discussed supportive cares at home and reasons for return to the ER including new or worsening symptoms - all questions and concerns addressed. Patient to be discharged by RN.   Patient was clinically assessed and consented to treatment. After assessment, medical decision making and workup were discussed with the patient. The patient was agreeable to plan for testing, workup, and treatment.  Pertinent Labs & Imaging studies reviewed. (See chart for details)       Medical Decision Making    History:  Supplemental history from: Documented in chart, if applicable  External Record(s) reviewed: Documented in chart, if applicable. and Other: 23 ED Visit    Work Up:  Chart documentation includes differential considered and any EKGs or imaging independently interpreted by provider, where specified.  In additional to work up documented, I considered the following work up: Documented in chart, if applicable.    External consultation:  Discussion of management with another provider: Documented in chart, if applicable    Complicating factors:  Care impacted by chronic illness: Mental Health  Care affected by social determinants of health: Access to Medical Care, Alcohol Abuse and/or Recreational Drug Use, and Housing Insecurity    Disposition considerations: Discharge. I prescribed additional prescription  strength medication(s) as charted. See documentation for any additional details.    Delroy Ramirez is a 38 year old male who presents with ankle pain.   Differential diagnosis includes but not limited to osteoarthritis, ankle sprain, dependent edema, shinsplints.  Patient with bilateral ankle pain he reports.  Patient has no bony tenderness on my exam, pulses are equal, no redness or erythema, no skin breakdown or injuries.  Patient does not have any joint effusions and given the lack of bony tenderness or joint effusion I do not feel x-rays are needed.  Patient reports that his ankles are just sore from walking.  He does have a little bit of dependent edema and as patient is homeless and on his feet a great deal.  I discussed with him trying to elevate his legs as much as possible when he is able to lay down and this will help remove some of the edema and help with some of the soreness.  Patient was given some ibuprofen, change socks, and allowed to rest for bed well having something to eat.  Patient will be plan for discharge with resources.    0 minutes of critical care time    MEDICATIONS GIVEN IN THE EMERGENCY:  Medications   ibuprofen (ADVIL/MOTRIN) tablet 600 mg (600 mg Oral $Given 10/7/23 3428)       NEW PRESCRIPTIONS STARTED AT TODAY'S ER VISIT:  New Prescriptions    IBUPROFEN (ADVIL/MOTRIN) 600 MG TABLET    Take 1 tablet (600 mg) by mouth every 6 hours as needed for moderate pain          =================================================================    HPI    Patient information was obtained from: the patient     Use of : N/A         Delroy Ramirez is a 38 year old male with a past medical history of depression, methamphetamine use disorder, and schizophrenia who presents to the ED via walk in for evaluation of ankle pain.     The patient reports bilateral ankle pain and swelling. He describes this pain as a soreness. He states that he has been on his feet a lot lately. He has not taken  any medications for his pain. He denies any recent injury to the area or a history of arthritis. The patient denies any other symptoms or complaints at this time.     Per Chart Review:   9/16/23 The patient presented to [facility] ED for evaluation of bilateral ankle and feet pain. His exam was notable for blistering of his feet, but no evidence of infection, fracture, or other acute abnormality. His breathalyzer was negative. He was discharged.     REVIEW OF SYSTEMS   Review of Systems   Musculoskeletal:  Positive for arthralgias (bilateral ankle pain) and joint swelling (bilateral ankles).   All other systems reviewed and are negative.     PAST MEDICAL HISTORY:  History reviewed. No pertinent past medical history.    PAST SURGICAL HISTORY:  History reviewed. No pertinent surgical history.    CURRENT MEDICATIONS:    No current facility-administered medications for this encounter.    Current Outpatient Medications:     ibuprofen (ADVIL/MOTRIN) 600 MG tablet, Take 1 tablet (600 mg) by mouth every 6 hours as needed for moderate pain, Disp: 20 tablet, Rfl: 0    buprenorphine-naloxone (SUBOXONE) 8-2 MG SUBL sublingual tablet, Place 1 tablet under the tongue 2 times daily After following home induction instructions., Disp: 14 tablet, Rfl: 0    cloNIDine (CATAPRES) 0.1 MG tablet, Take 1 tablet (0.1 mg) by mouth 3 times daily as needed (opioid withdrawal), Disp: 15 tablet, Rfl: 0    mirtazapine (REMERON) 15 MG tablet, , Disp: , Rfl:     naloxone (NARCAN) 4 MG/0.1ML nasal spray, Spray 1 spray (4 mg) into one nostril alternating nostrils as needed for opioid reversal every 2-3 minutes until assistance arrives (Patient not taking: Reported on 9/13/2022), Disp: 0.2 mL, Rfl: 11    OLANZapine (ZYPREXA) 5 MG tablet, Take 1 tablet (5 mg) by mouth nightly as needed (Sleep, anxiety), Disp: 10 tablet, Rfl: 0    ondansetron (ZOFRAN ODT) 4 MG ODT tab, Take 1 tablet (4 mg) by mouth every 8 hours as needed for nausea, Disp: 12 tablet,  "Rfl: 0    risperiDONE (RISPERDAL) 2 MG tablet, Take 1 tablet (2mg) by mouth nightly at bedtime scheduled and 1 tablet (2mg) by mouth once daily as needed for anxiety., Disp: , Rfl:     ALLERGIES:  No Known Allergies    FAMILY HISTORY:  History reviewed. No pertinent family history.    SOCIAL HISTORY:   Social History     Socioeconomic History    Marital status: Single   Tobacco Use    Smoking status: Every Day     Types: Cigarettes    Smokeless tobacco: Never    Tobacco comments:     \"Two cigarettes a day\"   Substance and Sexual Activity    Alcohol use: Never    Drug use: Never   Social History Narrative    ** Merged History Encounter **         ** Merged History Encounter **          PHYSICAL EXAM:    Vitals: /85   Pulse 88   Temp 97  F (36.1  C) (Temporal)   Resp 20   SpO2 97%    Physical Exam  Vitals and nursing note reviewed.   Constitutional:       General: He is not in acute distress.     Appearance: Normal appearance. He is normal weight. He is not ill-appearing or toxic-appearing.   HENT:      Head: Atraumatic.   Cardiovascular:      Pulses: Normal pulses.   Pulmonary:      Effort: No respiratory distress.   Musculoskeletal:         General: Swelling present. No tenderness, deformity or signs of injury.        Feet:    Skin:     General: Skin is warm and dry.      Findings: No bruising, erythema or rash.   Neurological:      Mental Status: He is alert.      Sensory: No sensory deficit.   Psychiatric:         Attention and Perception: He is attentive.         Behavior: Behavior is not agitated or aggressive. Behavior is cooperative.         Thought Content: Thought content is not paranoid or delusional.         Cognition and Memory: Cognition is not impaired.        LAB:  All pertinent labs reviewed and interpreted.  Labs Ordered and Resulted from Time of ED Arrival to Time of ED Departure - No data to display    RADIOLOGY:  No orders to display     PROCEDURES:   Procedures     I, Sia Mcclure am " serving as a scribe to document services personally performed by Dr. Taras Jj  based on my observation and the provider's statements to me. I, Taras Jj MD attest that Sia Mcclure is acting in a scribe capacity, has observed my performance of the services and has documented them in accordance with my direction.    Taras Jj M.D.  Emergency Medicine  [facility] Emergency Department      Taras Jj MD  10/08/23 0049

## 2023-10-08 NOTE — ED TRIAGE NOTES
"Pt states he is homeless and starting having ankle and foot pain that started 2 days ago.  Pt can't recall any injury but states he must of resulted from \"running around.\"  Pt denies any recent chemical use.        "

## 2023-10-08 NOTE — ED NOTES
"Pt endorsing \"no, man I need somewhere to stay\" when explained he is being discharged home. Pt given paperwork and discharge instructions and agrees to being discharged and verbalized understanding pt also Given new hospital socks and a pair for the road.   "

## 2024-03-20 ENCOUNTER — HOSPITAL ENCOUNTER (EMERGENCY)
Facility: CLINIC | Age: 39
Discharge: HOME | End: 2024-03-20
Attending: EMERGENCY MEDICINE | Admitting: EMERGENCY MEDICINE
Payer: COMMERCIAL

## 2024-03-20 ENCOUNTER — APPOINTMENT (OUTPATIENT)
Dept: CT IMAGING | Facility: CLINIC | Age: 39
End: 2024-03-20
Attending: EMERGENCY MEDICINE
Payer: COMMERCIAL

## 2024-03-20 ENCOUNTER — HOSPITAL ENCOUNTER (EMERGENCY)
Facility: CLINIC | Age: 39
Discharge: NURSING, REHAB OR LONG-TERM CARE | End: 2024-03-20
Attending: EMERGENCY MEDICINE | Admitting: EMERGENCY MEDICINE
Payer: COMMERCIAL

## 2024-03-20 VITALS
OXYGEN SATURATION: 97 % | HEART RATE: 98 BPM | SYSTOLIC BLOOD PRESSURE: 126 MMHG | DIASTOLIC BLOOD PRESSURE: 83 MMHG | RESPIRATION RATE: 18 BRPM | TEMPERATURE: 98.1 F

## 2024-03-20 VITALS
SYSTOLIC BLOOD PRESSURE: 147 MMHG | HEART RATE: 77 BPM | TEMPERATURE: 98 F | DIASTOLIC BLOOD PRESSURE: 92 MMHG | RESPIRATION RATE: 18 BRPM

## 2024-03-20 DIAGNOSIS — M25.571 CHRONIC PAIN OF BOTH ANKLES: ICD-10-CM

## 2024-03-20 DIAGNOSIS — Z59.00 HOMELESSNESS: ICD-10-CM

## 2024-03-20 DIAGNOSIS — S09.90XA CLOSED HEAD INJURY, INITIAL ENCOUNTER: ICD-10-CM

## 2024-03-20 DIAGNOSIS — G89.29 CHRONIC PAIN OF BOTH ANKLES: ICD-10-CM

## 2024-03-20 DIAGNOSIS — M25.572 CHRONIC PAIN OF BOTH ANKLES: ICD-10-CM

## 2024-03-20 PROCEDURE — 70450 CT HEAD/BRAIN W/O DYE: CPT

## 2024-03-20 PROCEDURE — 99283 EMERGENCY DEPT VISIT LOW MDM: CPT

## 2024-03-20 PROCEDURE — 99284 EMERGENCY DEPT VISIT MOD MDM: CPT | Mod: 25

## 2024-03-20 SDOH — ECONOMIC STABILITY - HOUSING INSECURITY: HOMELESSNESS UNSPECIFIED: Z59.00

## 2024-03-20 ASSESSMENT — ACTIVITIES OF DAILY LIVING (ADL)
ADLS_ACUITY_SCORE: 35
ADLS_ACUITY_SCORE: 35

## 2024-03-20 ASSESSMENT — COLUMBIA-SUICIDE SEVERITY RATING SCALE - C-SSRS
6. HAVE YOU EVER DONE ANYTHING, STARTED TO DO ANYTHING, OR PREPARED TO DO ANYTHING TO END YOUR LIFE?: NO
1. IN THE PAST MONTH, HAVE YOU WISHED YOU WERE DEAD OR WISHED YOU COULD GO TO SLEEP AND NOT WAKE UP?: NO
1. IN THE PAST MONTH, HAVE YOU WISHED YOU WERE DEAD OR WISHED YOU COULD GO TO SLEEP AND NOT WAKE UP?: NO
2. HAVE YOU ACTUALLY HAD ANY THOUGHTS OF KILLING YOURSELF IN THE PAST MONTH?: NO
6. HAVE YOU EVER DONE ANYTHING, STARTED TO DO ANYTHING, OR PREPARED TO DO ANYTHING TO END YOUR LIFE?: NO
2. HAVE YOU ACTUALLY HAD ANY THOUGHTS OF KILLING YOURSELF IN THE PAST MONTH?: NO

## 2024-03-20 NOTE — ED TRIAGE NOTES
Pt was staring into a store and store owner ccalled PD. Pt states he is homeless and hasn't eaten or slept in a few days. He is here to get housing assistance.      Triage Assessment (Adult)       Row Name 03/20/24 7331          Triage Assessment    Airway WDL WDL        Respiratory WDL    Respiratory WDL WDL        Skin Circulation/Temperature WDL    Skin Circulation/Temperature WDL WDL        Cardiac WDL    Cardiac WDL WDL        Peripheral/Neurovascular WDL    Peripheral Neurovascular WDL WDL        Cognitive/Neuro/Behavioral WDL    Cognitive/Neuro/Behavioral WDL WDL

## 2024-03-20 NOTE — ED PROVIDER NOTES
History     Chief Complaint:  Ankle Pain and Fall       HPI   Delroy Ramirez is a 38 year old male with a history of paranoid schizophrenia and methamphetamine abuse who presents with a fall earlier today. He reports he was running and tripped, twisting his right ankle and hitting his head on the curb. Reports brief loss of consciousness. He is not on blood thinners. Denies vision changes, nausea, or neck pain.    Independent Historian:   None - Patient Only    Review of External Notes:   I reviewed numerous recent ER visits for similar complaints of head trauma and chronic ankle pain    Medications:    Catapres  Remeron  Zyprexa  Risperdal    Past Medical History:    Psychosis  Methamphetamine use disorder   Depression   Paranoid schizophrenia    Past Surgical History:    Wrist surgery     Physical Exam   Patient Vitals for the past 24 hrs:   BP Temp Temp src Pulse Resp SpO2   03/20/24 0223 126/83 98.1  F (36.7  C) Temporal 98 18 97 %        Physical Exam  General: Patient is alert, awake and interactive when I enter the room  Head: The scalp, face, and head appear normal. Atraumatic.   Eyes: The pupils are equal, round, and reactive to light. Conjunctivae and sclerae are normal  ENT: No hemotympanum or signs basilar skull fracture. The oropharynx is normal without erythema. No tenderness to palpation of the face, nose or jaw.   Neck: Normal range of motion. No cervical midline tenderness.   CV: Regular rate. S1/S2. No murmurs.   Resp: Lungs are clear without wheezes or rales. No distress. No crepitance.   GI: Abdomen is soft, no rigidity, guarding, or rebound. No contusion. No distension. No tenderness to palpation in any quadrant.     MS: No significant tenderness to palpation of medial or lateral malleolus bilaterally.  Normal passive range of motion of ankle bilaterally.  Patient able to ambulate without difficulty.  Normal tone. Joints grossly normal without effusions. No asymmetric leg swelling, calf or  "thigh tenderness. Normal motor assessment of all extremities. PROM performed of all major joints without pain . No C/T/L tenderness in midline  Skin: No rash or lesions noted. Normal capillary refill noted  Neuro:  GCS 15.  CN\"s II-XII intact. Speech is normal and fluent. Face is symmetric. Strength is normal and symmetric.   Psych: Normal affect.  Appropriate interactions.    Emergency Department Course   Imaging:  CT Head w/o Contrast   Final Result   IMPRESSION:   1.  No acute intracranial hemorrhage or calvarial fracture.   2.  Small amount of pneumatized fluid in the maxillary sinuses and frontal sinuses. Correlate for acute sinusitis.         Emergency Department Course & Assessments:      Independent Interpretation (X-rays, CTs, rhythm strip):  Ct of the head is negative for ICH    Assessments/Consultations/Discussion of Management or Tests:  ED Course as of 03/20/24 0346   Wed Mar 20, 2024   0250 I obtained the history and examined the patient as noted above.    0334 I rechecked and updated the patient. They were amenable to plan for discharge.        Social Determinants of Health affecting care:   Homelessness/Housing Insecurity    Disposition:  The patient was discharged.     Impression & Plan    Medical Decision Making:  Patient is a 38-year-old gentleman who has chronic homelessness who presents to the emergency department with head trauma and chronic ankle pain.  CT of the head was negative for any evidence of intracranial hemorrhage.  No indication for imaging of bilateral ankles this is a chronic condition.  He has no significant tenderness to either lateral or medial malleolus bilaterally.  No additional complaints.  Patient continually asked for more time to \"rest.\" I instructed him that he would not be able to have a sleep in the emergency room overnight.    Diagnosis:    ICD-10-CM    1. Closed head injury, initial encounter  S09.90XA       2. Chronic pain of both ankles  M25.571     G89.29     " M25.572            Discharge Medications:  New Prescriptions    No medications on file        Scribe Disclosure:  I, Brigette Donato, am serving as a scribe at 2:45 AM on 3/20/2024 to document services personally performed by Taras Campa MD based on my observations and the provider's statements to me.     3/20/2024   Taras Campa MD Battista, Christopher Joseph, MD  03/21/24 0231

## 2024-03-20 NOTE — ED PROVIDER NOTES
History     Chief Complaint:  Homelessness    The history is provided by the patient.      Delroy Ramirez is a 38 year old male with a history of methamphetamine use presenting requests of assistance with shelter/crisis bed.  Patient was seen at [facility] earlier this morning for head injury and ankle pain. Patient would like assistance finding housing. Patient denies active pain. He is prescribed Zyprexa and trazodone, but does not take these consistently.  Patient does not report any concerns regarding insomnia as opposed to what he told triage nurse.    Independent Historian:   None - Patient Only    Review of External Notes:   None    Medications:    Clonidine   Mirtazapine   Olanzapine   Ondansetron   Risperidone     Past Medical History:    Psychosis  Methamphetamine use disorder   Depression   Paranoid schizophrenia     Past Surgical History:    Wrist surgery     Physical Exam   Patient Vitals for the past 24 hrs:   BP Temp Temp src Pulse Resp   03/20/24 1610 (!) 147/92 98  F (36.7  C) Oral 77 18      Physical Exam    General:   Patient watching TV and flipping channels during physical examination  Eyes:    Conjunctiva normal  Neck:     Supple, no meningismus.     CV:     Regular rate and rhythm.      No murmurs, rubs or gallops.       No  lower extremity edema.  PULM:    Clear to auscultation bilateral.       No respiratory distress.      Good air exchange.  ABD:    Soft, non-tender, non-distended.       No rebound, guarding or rigidity.  MSK:     No gross deformity to all four extremities.   LYMPH:   No cervical lymphadenopathy.  NEURO:   Alert, good muscular tone, no atrophy.   Skin:    Warm, dry and intact.    Psych:    Mood is good and affect is appropriate.      Emergency Department Course      Imaging:  No orders to display      Laboratory:  Labs Ordered and Resulted from Time of ED Arrival to Time of ED Departure - No data to display     Emergency Department Course &  Assessments:    Interventions:  Medications - No data to display   Independent Interpretation (X-rays, CTs, rhythm strip):  No imaging completed    Assessments/Consultations/Discussion of Management or Tests:  ED Course as of 03/20/24 1812   Wed Mar 20, 2024   1612 I obtained the history and examined the patient as noted above.        Social Determinants of Health affecting care:   Food Insecurity and Homelessness/Housing Insecurity    Disposition:  The patient was discharged to home.     Impression & Plan    CMS Diagnoses: None    MIPS (If applicable):  N/A    Medical Decision Makin-year-old male presents with concerns of homelessness.  He is requesting that we assist him in finding a short-term and potentially long-term bed/housing.  Patient made aware that we are unable to place to long-term housing from the ED.  Patient was given a list of crisis beds and ultimately found a crisis bed at [facility].  Patient assisted with transfer to crisis facility.    Diagnosis:    ICD-10-CM    1. Homelessness  Z59.00          Discharge Medications:  Discharge Medication List as of 3/20/2024  5:01 PM         Scribe Disclosure:  I, Rupinder Ramos, am serving as a scribe at 4:19 PM on 3/20/2024 to document services personally performed by Curt Becerril MD based on my observations and the provider's statements to me.  3/20/2024   Curt Becerril MD Matthews, Jeremiah R, MD  [phone]

## 2024-03-20 NOTE — ED TRIAGE NOTES
Pt presents via EMS to triage ambulatory and asking for TV remote and something to drink. Called 911 after falling and hitting his head about 0145 this morning and c/o moisés ankle pain. States he was running outside and rolled ankle and then fell. States R ankle hurts 6/10. VSS, A&Ox4, ABCs intact.

## 2024-03-21 ENCOUNTER — APPOINTMENT (OUTPATIENT)
Dept: GENERAL RADIOLOGY | Facility: CLINIC | Age: 39
End: 2024-03-21
Attending: EMERGENCY MEDICINE
Payer: COMMERCIAL

## 2024-03-21 ENCOUNTER — APPOINTMENT (OUTPATIENT)
Dept: CT IMAGING | Facility: CLINIC | Age: 39
End: 2024-03-21
Attending: EMERGENCY MEDICINE
Payer: COMMERCIAL

## 2024-03-21 ENCOUNTER — HOSPITAL ENCOUNTER (EMERGENCY)
Facility: CLINIC | Age: 39
Discharge: HOME | End: 2024-03-22
Attending: EMERGENCY MEDICINE | Admitting: EMERGENCY MEDICINE
Payer: COMMERCIAL

## 2024-03-21 ENCOUNTER — HOSPITAL ENCOUNTER (EMERGENCY)
Facility: CLINIC | Age: 39
Discharge: HOME | End: 2024-03-21
Attending: EMERGENCY MEDICINE | Admitting: EMERGENCY MEDICINE
Payer: COMMERCIAL

## 2024-03-21 VITALS
TEMPERATURE: 97 F | OXYGEN SATURATION: 97 % | HEART RATE: 79 BPM | SYSTOLIC BLOOD PRESSURE: 107 MMHG | DIASTOLIC BLOOD PRESSURE: 77 MMHG | RESPIRATION RATE: 16 BRPM

## 2024-03-21 DIAGNOSIS — M25.572 ACUTE LEFT ANKLE PAIN: ICD-10-CM

## 2024-03-21 DIAGNOSIS — Z59.00 HOMELESSNESS: ICD-10-CM
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
## 2024-03-21 DIAGNOSIS — M25.571 ACUTE RIGHT ANKLE PAIN: ICD-10-CM

## 2024-03-21 DIAGNOSIS — S09.90XA CLOSED HEAD INJURY, INITIAL ENCOUNTER: ICD-10-CM

## 2024-03-21 LAB
ALBUMIN SERPL BCG-MCNC: 4.2 G/DL (ref 3.5–5.2)
ALP SERPL-CCNC: 82 U/L (ref 40–150)
ALT SERPL W P-5'-P-CCNC: 26 U/L (ref 0–70)
ANION GAP SERPL CALCULATED.3IONS-SCNC: 10 MMOL/L (ref 7–15)
AST SERPL W P-5'-P-CCNC: 26 U/L (ref 0–45)
BASOPHILS # BLD AUTO: 0.1 10E3/UL (ref 0–0.2)
BASOPHILS NFR BLD AUTO: 1 %
BILIRUB SERPL-MCNC: 0.3 MG/DL
BUN SERPL-MCNC: 11.5 MG/DL (ref 6–20)
CALCIUM SERPL-MCNC: 9.1 MG/DL (ref 8.6–10)
CHLORIDE SERPL-SCNC: 103 MMOL/L (ref 98–107)
CREAT SERPL-MCNC: 0.94 MG/DL (ref 0.67–1.17)
DEPRECATED HCO3 PLAS-SCNC: 29 MMOL/L (ref 22–29)
EGFRCR SERPLBLD CKD-EPI 2021: >90 ML/MIN/1.73M2
EOSINOPHIL # BLD AUTO: 0.6 10E3/UL (ref 0–0.7)
EOSINOPHIL NFR BLD AUTO: 7 %
ERYTHROCYTE [DISTWIDTH] IN BLOOD BY AUTOMATED COUNT: 12.1 % (ref 10–15)
ETHANOL SERPL-MCNC: <0.01 G/DL
GLUCOSE SERPL-MCNC: 92 MG/DL (ref 70–99)
HCT VFR BLD AUTO: 44.1 % (ref 40–53)
HGB BLD-MCNC: 14.6 G/DL (ref 13.3–17.7)
IMM GRANULOCYTES # BLD: 0.1 10E3/UL
IMM GRANULOCYTES NFR BLD: 1 %
LYMPHOCYTES # BLD AUTO: 2.9 10E3/UL (ref 0.8–5.3)
LYMPHOCYTES NFR BLD AUTO: 35 %
MCH RBC QN AUTO: 30 PG (ref 26.5–33)
MCHC RBC AUTO-ENTMCNC: 33.1 G/DL (ref 31.5–36.5)
MCV RBC AUTO: 91 FL (ref 78–100)
MONOCYTES # BLD AUTO: 0.7 10E3/UL (ref 0–1.3)
MONOCYTES NFR BLD AUTO: 8 %
NEUTROPHILS # BLD AUTO: 4.1 10E3/UL (ref 1.6–8.3)
NEUTROPHILS NFR BLD AUTO: 48 %
NRBC # BLD AUTO: 0 10E3/UL
NRBC BLD AUTO-RTO: 0 /100
PLATELET # BLD AUTO: 206 10E3/UL (ref 150–450)
POTASSIUM SERPL-SCNC: 3.8 MMOL/L (ref 3.4–5.3)
PROT SERPL-MCNC: 6.8 G/DL (ref 6.4–8.3)
RBC # BLD AUTO: 4.86 10E6/UL (ref 4.4–5.9)
SODIUM SERPL-SCNC: 142 MMOL/L (ref 135–145)
WBC # BLD AUTO: 8.5 10E3/UL (ref 4–11)

## 2024-03-21 PROCEDURE — 99283 EMERGENCY DEPT VISIT LOW MDM: CPT | Performed by: EMERGENCY MEDICINE

## 2024-03-21 PROCEDURE — 82077 ASSAY SPEC XCP UR&BREATH IA: CPT | Performed by: EMERGENCY MEDICINE

## 2024-03-21 PROCEDURE — 82040 ASSAY OF SERUM ALBUMIN: CPT | Performed by: EMERGENCY MEDICINE

## 2024-03-21 PROCEDURE — 250N000013 HC RX MED GY IP 250 OP 250 PS 637: Performed by: EMERGENCY MEDICINE

## 2024-03-21 PROCEDURE — 99284 EMERGENCY DEPT VISIT MOD MDM: CPT | Mod: 25

## 2024-03-21 PROCEDURE — 73610 X-RAY EXAM OF ANKLE: CPT | Mod: LT

## 2024-03-21 PROCEDURE — 70450 CT HEAD/BRAIN W/O DYE: CPT

## 2024-03-21 PROCEDURE — 85025 COMPLETE CBC W/AUTO DIFF WBC: CPT | Performed by: EMERGENCY MEDICINE

## 2024-03-21 PROCEDURE — 36415 COLL VENOUS BLD VENIPUNCTURE: CPT | Performed by: EMERGENCY MEDICINE

## 2024-03-21 RX ORDER — ACETAMINOPHEN 500 MG
1000 TABLET ORAL ONCE
Status: COMPLETED | OUTPATIENT
Start: 2024-03-21 | End: 2024-03-21

## 2024-03-21 RX ADMIN — ACETAMINOPHEN 1000 MG: 500 TABLET ORAL at 23:16

## 2024-03-21 SDOH — ECONOMIC STABILITY - HOUSING INSECURITY: HOMELESSNESS UNSPECIFIED: Z59.00

## 2024-03-21 ASSESSMENT — COLUMBIA-SUICIDE SEVERITY RATING SCALE - C-SSRS
1. IN THE PAST MONTH, HAVE YOU WISHED YOU WERE DEAD OR WISHED YOU COULD GO TO SLEEP AND NOT WAKE UP?: NO
1. IN THE PAST MONTH, HAVE YOU WISHED YOU WERE DEAD OR WISHED YOU COULD GO TO SLEEP AND NOT WAKE UP?: NO
6. HAVE YOU EVER DONE ANYTHING, STARTED TO DO ANYTHING, OR PREPARED TO DO ANYTHING TO END YOUR LIFE?: NO
6. HAVE YOU EVER DONE ANYTHING, STARTED TO DO ANYTHING, OR PREPARED TO DO ANYTHING TO END YOUR LIFE?: NO
2. HAVE YOU ACTUALLY HAD ANY THOUGHTS OF KILLING YOURSELF IN THE PAST MONTH?: NO
2. HAVE YOU ACTUALLY HAD ANY THOUGHTS OF KILLING YOURSELF IN THE PAST MONTH?: NO

## 2024-03-21 ASSESSMENT — ACTIVITIES OF DAILY LIVING (ADL): ADLS_ACUITY_SCORE: 35

## 2024-03-21 NOTE — DISCHARGE INSTRUCTIONS
[facility] [employer] homeless assistance ([facility])  provides housing services for people experiencing homelessness   - for single adults:   [facility] at [facility] [address] , [place] [address]  - Wednesdays from 8:00am-2:00pm  - Thursdays from 10:30am-2:00pm .   *These are drop-in hours and available on a first come, first serve basis. To schedule an appointment outside these times, contact [facility] Services  certified assessors: Mir ([phone]) or Vilma ([phone]).  - for families:  Call [facility]  at [phone] for access to the family assessors.  - for victims of domestic violence:   Call Cherrie Burton at [phone] ext. [phone] at the [employer] Abuse [employer].   OR   [facility] 24hr crisis hotline, 1-856.781.9091 (text option 915-818-0470)  - for youth (up to age 24):   [facility] https://ysnmn.org/#/shelter     EMERGENCY HOUSING  [facility] Hotline:  1-451.392.1134  The [facility] Hotline operates through a toll-free number, 1-472.230.7824, to link homeless callers to shelter information, 24 hours a day, seven days a week. Callers will then access specific information about shelter and transitional housing programs in the [facility] from which they are calling. The hotline helps ensure that up-to-date information is available to homeless persons quickly and easily  The [facility] Hotline gives information on specific admission requirements and where and how to receive pre-approval in their [facility]. This helps people seeking shelter avoid multiple phone calls--and the hotline is available even during evening hours when regular offices are closed    [facility] Emergency Housing:  Adult Shelter Connect:  Individuals will need to visit the Adult Shelter Connect (ASC) for an assessment and placement at one of the five [facility] shelters and referrals to other services    ASC Direct Line:  [phone]  Location:     [facility] [address]  Hours:  Monday-Friday: 9:00 AM- 5:30 PM  Saturday & Sunday: 1:00 PM- 5:30 PM  After Hours: [phone]    [facility] Emergency Housing:  [facility] (for battered women and their children) - [phone]  [facility] (single adults) - [phone]  [facility] (family shelter) - [phone]    [facility] Emergency Housing:   For Families:  Shelter space is reserved for [facility] families with minor children. To determine eligibility, call the [facility] at 308-370-7241.  For those experiencing domestic violence, [facility] may be able to help find a safe place for families while fleeing abuse. Call [facility] at 1-525.468.8551  Families who are sleeping in a place not meant for human habilitation (streets, car, camping, public transit, etc.) or staying at a domestic violence shelter and are looking for supportive housing can call [phone]  Space for [facility] homeless family emergency shelter is limited and beds are not immediately available. Completing an intake with [facility] staff is the only way to be placed on the shelter waitlist.  For Youth:  Anyone age 24 or younger can connect with a variety of resources by visiting the website. This provides UP TO DATE information:    [email or web address].org/#/home  For Singles:  Single adults who are 25 years or older and seeking immediate shelter can call the [facility] at [phone].  Single adults who are 25 years or older, currently experiencing homelessness and not staying in a shelter, will need to complete a housing assessment to determine long-term housing options. Contact a housing  at [phone].  For Veterans:  Any  in need of housing assistance or resources can contact [facility]'s [facility] Services at [phone].    [facility] Emergency Housing:  [employer] [facility] for Homeless  Households  [employer] is a new resource to help those who are homeless (or within days of eviction) access appropriate housing and services. Please keep in mind that, due to limited resources, we may not be able to assist you in finding an immediate resource.    If you are currently receiving case management services of any kind from [facility], please start by contacting your county .    [employer] services are provided by three different agencies, depending on the household needing help: single adults, families with children, or unaccompanied youth.  Youth under age 24: Visit [facility]'s website:  www.ysnmn.org  Families with children: Call [facility] at [phone]  Single adults/couples: Call [facility] at [phone]        Instructions from your doctor today:  Emergency Department (ED) testing is focused on the potential causes of your symptoms that are the most dangerous possibilities, and cannot cover every possibility. Based on the evaluation, it was deemed sufficiently safe to discharge and continue management through the clinics. Thus, follow-up is very important to assess for improvement/worsening, potential further testing, and potential treatment adjustments. If you were given opioid pain medications or other medications that can make you drowsy while in the ED, you should not drive for at least several hours and not until you feel completely back to normal.     Please make an appointment to follow up with:  - Your Primary Care Provider in 7 days if ongoing pain  - If you do not have a primary care provider, you can be seen in follow-up and establish care by calling any of the clinics below:     - [facility] (phone: [phone])     - [facility] (phone: [phone])   - Have your clinic provider review the results from today's visit with you again,  including any potential follow-up or additional testing that may be needed based on the results. Occasionally, incidental findings are found on later review by radiologists that may need follow-up.     Return to the Emergency Department immediately if you have urgent health concerns.

## 2024-03-21 NOTE — ED TRIAGE NOTES
BIBA for complaint of old head injury and ankle pain, no new traumas.  VSS.    Pt ambulate into triage room from ED lobby with steady unassisted non limping gait.  Pt reports right side head and left ankle pain, denies new traumas to both.  Pt states he needs a place to stay.      Triage Assessment (Adult)       Row Name 03/21/24 0122          Triage Assessment    Airway WDL WDL        Respiratory WDL    Respiratory WDL WDL        Cardiac WDL    Cardiac WDL WDL        Cognitive/Neuro/Behavioral WDL    Cognitive/Neuro/Behavioral WDL WDL

## 2024-03-21 NOTE — ED PROVIDER NOTES
History     Chief Complaint   Patient presents with    Pain     BIBA for complaint of old head injury and ankle pain, no new traumas.  VSS.    Pt ambulate into triage room from ED lobby with steady unassisted non limping gait.  Pt reports right side head and left ankle pain, denies new traumas to both.  Pt states he needs a place to stay.      HPI  Delroy Ramirez is a 38 year old male with PMH notable for homelessness, schizophrenia  who presents to the ED with ankle pain.  Patient reports that he had a fall a few hours ago.  He is concerned that he sprained his ankle.  Patient also reports that he bumped rates of his head.  No loss of consciousness, no vomiting, no confusion.  Patient Dors is mild pain on the lateral aspect of the right ankle.    Patient also notes having homelessness concerns.  Patient interested in shelter resources.    Physical Exam   BP: 107/77  Pulse: 79  Temp: 97  F (36.1  C)  Resp: 16  SpO2: 97 %    Physical Exam  General: no acute distress. Appears stated age.   HENT: MMM, no oropharyngeal lesions.  No scalp hematoma.  No Hart sign.  No hemotympanum.  Eyes: PERRL, normal sclerae   Cardio: Regular rate. Regular rhythm. Extremities well perfused  Resp: Normal work of breathing, Normal respiratory rate.   Abdomen: no tenderness, non-distended, no rebound, no guarding  Neuro: alert without signs of confusion. CN II-XII grossly intact. Grossly normal strength and sensation in all extremities.   Psych: normal affect, normal behavior  MSK: Right ankle with no swelling, no significant tenderness, slight pain with inversion against resistance    ED Course      Procedures              Labs Ordered and Resulted from Time of ED Arrival to Time of ED Departure - No data to display  No orders to display        Medical Decision Making  The patient's presentation was of low complexity (2+ clearly self-limited or minor problems).    The patient's evaluation involved:  review of external note(s) from 1  sources ([facility] ED)    The patient's management necessitated moderate risk (limitations due to social determinants of health (see separate area of note for details)).      Assessments & Plan   Patient presenting with reported ankle pain after fall, also homelessness concerns. Vitals in the ED unremarkable. Nursing notes reviewed.     Patient reported to this provider that he had fallen a few hours prior to arrival.  To triage nurse he had stated that the injuries were old.  Chart review notable for ED visit at [facility] around 4 PM this past evening reporting only homelessness concerns.  Patient also seen at 2 AM at [facility] this past day.    Exam tonight without evidence of significant injury.    After counseling on the diagnosis, work-up, and treatment plan, the patient was discharged.  Patient was dressed appropriately for the weather.  Patient provided with homelessness resources and shelter connect contact information.  The patient was advised to follow-up with primary care in about a week if ongoing pain. The patient was advised to return to the ED if any urgent health concerns.     Final diagnoses:   Acute right ankle pain   Homelessness     New Prescriptions    No medications on file     --  Stanislaw Ritchie MD   Emergency Medicine   [facility] EMERGENCY DEPARTMENT  3/21/2024       Stanislaw Ritchie MD  03/21/24 0141

## 2024-03-22 VITALS
TEMPERATURE: 98.2 F | OXYGEN SATURATION: 97 % | RESPIRATION RATE: 18 BRPM | HEART RATE: 75 BPM | DIASTOLIC BLOOD PRESSURE: 77 MMHG | SYSTOLIC BLOOD PRESSURE: 125 MMHG

## 2024-03-22 NOTE — ED PROVIDER NOTES
History     Chief Complaint:  Fall    The history is provided by the patient.      Delroy Ramirez is a 38 year old male with a history of paranoid schizophrenia, antisocial personality disorder, and methamphetamine use disorder who presents to the ED via EMS for evaluation after a fall. The patient reports he tripped when he was running. He states that he hit his hit and injured his left ankle. He didn't take anything for the pain. He denies losing consciousness. He reports frontal headache and L. Ankle pain. No neck pain, back pain, chest/abdominal pain, dyspnea, vomiting or other symptoms. He notes that he is homeless and has been staying at shelters. He is requesting a shower. He denies alcohol or drug usage.     Independent Historian:   None - Patient Only    Review of External Notes:   Patient's CT head from 3/20/24 impression:  1.  No acute intracranial hemorrhage or calvarial fracture.  2.  Small amount of pneumatized fluid in the maxillary sinuses and frontal sinuses. Correlate for acute sinusitis.     Medications:    Buprenorphine-naloxone  Clonidine  Mirtazapine  Naloxone  Olanzapine  Ondansetron  Risperidone  Hydroxyzine pamoate  Nicotine  Trazodone  Albuterol    Past Medical History:    Psychosis  Substance-induced psychotic disorder with hallucinations  Methamphetamine use disorder  Depression  Paranoid schizophrenia  Tobacco use  Suicidal ideation  Anxiety  Antisocial personality disorder  AMADEO  Hepatic injury  Traumatic rhabdomyolysis   Arm fracture  IV drug user  Misuse of prescription drugs    Past Surgical History:    Wrist surgery    Physical Exam   Patient Vitals for the past 24 hrs:   BP Temp Temp src Pulse Resp SpO2   03/22/24 0041 -- -- -- -- -- 97 %   03/22/24 0040 125/77 -- -- 75 -- --   03/21/24 2249 -- 98.2  F (36.8  C) Oral -- 18 99 %   03/21/24 2242 136/78 -- -- 86 -- --     Physical Exam  General: Alert. Poor body odor, disheveled  Head:  The scalp, face, and head appear normal  without trauma  Eyes:  Sclera white; Pupils are equal and round  ENT:    External ears normal.  No hemotympanum.      External nares normal.  No septal hematoma.    Neck:  No midline tenderness or pain with full ROM.  CV:  Rate as above with regular rhythm   2/2 radial and dorsal pedal pulses  Resp:  Breath sounds clear and equal bilaterally    Non-labored, no retractions or accessory muscle use  GI:  Abdomen soft, non-tender, non-distended    No rebound tenderness or guarding  MSK:  No midline tenderness or bony step-off    No deformity    Moves all extremities equally and symmetrically other than slight tenderness to L. Ankle with mild soft tissue swelling. Slightly decreased ROM DF/PF L. Ankle tohugh able to flex/extend all toes. No L. Fibular head tenderness.   Skin:  No rash or lesions noted.  Neuro:   No apparent deficit.    Strength 5/5 x4.  Sensation intact x4.     GCS: 15  Psych:  Flat affect       Emergency Department Course   Imaging:  XR Ankle Left G/E 3 Views   Final Result   IMPRESSION: Normal joint spaces and alignment. No fracture.      Head CT w/o contrast   Final Result   IMPRESSION:   1.  No acute intracranial hemorrhage or calvarial fracture.   2.  Small amount of pneumatized fluid in the frontal sinuses. Correlate for acute sinusitis.        Laboratory:  Labs Ordered and Resulted from Time of ED Arrival to Time of ED Departure   COMPREHENSIVE METABOLIC PANEL - Normal       Result Value    Sodium 142      Potassium 3.8      Carbon Dioxide (CO2) 29      Anion Gap 10      Urea Nitrogen 11.5      Creatinine 0.94      GFR Estimate >90      Calcium 9.1      Chloride 103      Glucose 92      Alkaline Phosphatase 82      AST 26      ALT 26      Protein Total 6.8      Albumin 4.2      Bilirubin Total 0.3     ETHYL ALCOHOL LEVEL - Normal    Alcohol ethyl <0.01     CBC WITH PLATELETS AND DIFFERENTIAL    WBC Count 8.5      RBC Count 4.86      Hemoglobin 14.6      Hematocrit 44.1      MCV 91      MCH 30.0       MCHC 33.1      RDW 12.1      Platelet Count 206      % Neutrophils 48      % Lymphocytes 35      % Monocytes 8      % Eosinophils 7      % Basophils 1      % Immature Granulocytes 1      NRBCs per 100 WBC 0      Absolute Neutrophils 4.1      Absolute Lymphocytes 2.9      Absolute Monocytes 0.7      Absolute Eosinophils 0.6      Absolute Basophils 0.1      Absolute Immature Granulocytes 0.1      Absolute NRBCs 0.0       Emergency Department Course & Assessments:    Interventions:  Medications   acetaminophen (TYLENOL) tablet 1,000 mg (1,000 mg Oral $Given 3/21/24 4511)     Assessments:  4329 I obtained history and examined the patient as noted above.     Independent Interpretation (X-rays, CTs, rhythm strip):  L. Ankle: no focal fracture    Consultations/Discussion of Management or Tests:  None        Social Determinants of Health affecting care:   Homelessness/Housing Insecurity    Disposition:  The patient was discharged.     Impression & Plan    Medical Decision Making:  Patient is a 38-year-old male presenting status post a mechanical fall with reported head trauma as well as left ankle pain.  He does have slight soft tissue swelling and tenderness to his left ankle precipitating x-ray which fortunately is without focal fracture or dislocation.  I did discuss with the patient cannot exclude ligamentous injury.  I recommended a walking boot at this point in time, with RICE, tylenol/motrin PRN.  EMT attempted to place walking prior to patient's discharge though he refused this on discharge and walked out of the ED.  He did undergo formal head CT though fortunately this is unremarkable for acute traumatic injury.  Incidental fluid in the maxillary sinuses noted though he denies any URI symptoms and clinically I doubt sinusitis. Discussed closed head injury precautions and red flag symptoms which would necessitate return to the ED. The remainder of his trauma exam is unremarkable. Return precautions given.      Diagnosis:    ICD-10-CM    1. Fall, initial encounter  W19.XXXA       2. Closed head injury, initial encounter  S09.90XA       3. Homelessness  Z59.00       4. Acute left ankle pain  M25.572 Ankle/Foot Bracing Supplies Order Walking Boot; Left; Pneumatic; Short        Discharge Medications:  New Prescriptions    No medications on file     Scribe Disclosure:  I, Asael Llanes, am serving as a scribe at 11:11 PM on 3/21/2024 to document services personally performed by Rachele Echevarria DO, based on my observations and the provider's statements to me.   3/21/2024   Rachele Echevarria DO McDonald, Lindsey E, DO  03/22/24 0231

## 2024-03-22 NOTE — ED TRIAGE NOTES
Pt was BIBA. As per EMS pt. Is walking around from the bus stop, fell and injured his left ankle, hit his head, possible loss of consciousness. Pt can't recall situation how he end up in [facility] humble a, Staff called EMS. Pt is confused to day and time. Denies any pain. Vitals stable. Denies using Illicit drugs and alcohol.     Triage Assessment (Adult)       Row Name 03/21/24 5913          Triage Assessment    Airway WDL WDL        Respiratory WDL    Respiratory WDL WDL        Skin Circulation/Temperature WDL    Skin Circulation/Temperature WDL WDL        Cardiac WDL    Cardiac WDL WDL        Peripheral/Neurovascular WDL    Peripheral Neurovascular WDL WDL        Cognitive/Neuro/Behavioral WDL    Cognitive/Neuro/Behavioral WDL X     Level of Consciousness confused     Orientation place;time;disoriented to     Mood/Behavior calm;cooperative        Allen Coma Scale    Best Eye Response 4-->(E4) spontaneous     Best Motor Response 6-->(M6) obeys commands     Best Verbal Response 4-->(V4) confused     Marcelo Coma Scale Score 14

## 2024-03-22 NOTE — ED NOTES
Patient requesting to continue resting in room, patient told they can rest after discharge in lobby if needed. Security to assist patient out of room.

## 2024-12-08 ENCOUNTER — HOSPITAL ENCOUNTER (EMERGENCY)
Facility: CLINIC | Age: 39
Discharge: HOME | End: 2024-12-10
Attending: EMERGENCY MEDICINE | Admitting: EMERGENCY MEDICINE
Payer: COMMERCIAL

## 2024-12-08 DIAGNOSIS — F19.951 SUBSTANCE-INDUCED PSYCHOTIC DISORDER WITH HALLUCINATIONS (H): ICD-10-CM

## 2024-12-08 DIAGNOSIS — F19.10 POLYSUBSTANCE ABUSE (H): ICD-10-CM

## 2024-12-08 DIAGNOSIS — F19.959 SUBSTANCE-INDUCED PSYCHOTIC DISORDER (H): Primary | ICD-10-CM

## 2024-12-08 DIAGNOSIS — R41.82 ALTERED MENTAL STATUS, UNSPECIFIED ALTERED MENTAL STATUS TYPE: ICD-10-CM

## 2024-12-08 PROCEDURE — 99285 EMERGENCY DEPT VISIT HI MDM: CPT | Mod: 25

## 2024-12-08 PROCEDURE — 250N000013 HC RX MED GY IP 250 OP 250 PS 637: Performed by: EMERGENCY MEDICINE

## 2024-12-08 RX ORDER — OLANZAPINE 10 MG/1
10 TABLET, ORALLY DISINTEGRATING ORAL 3 TIMES DAILY PRN
Status: DISCONTINUED | OUTPATIENT
Start: 2024-12-08 | End: 2024-12-10 | Stop reason: HOSPADM

## 2024-12-08 RX ORDER — OLANZAPINE 10 MG/1
10 TABLET, ORALLY DISINTEGRATING ORAL ONCE
Status: COMPLETED | OUTPATIENT
Start: 2024-12-08 | End: 2024-12-08

## 2024-12-08 RX ADMIN — OLANZAPINE 10 MG: 10 TABLET, ORALLY DISINTEGRATING ORAL at 16:38

## 2024-12-08 RX ADMIN — OLANZAPINE 10 MG: 10 TABLET, ORALLY DISINTEGRATING ORAL at 11:03

## 2024-12-08 ASSESSMENT — ACTIVITIES OF DAILY LIVING (ADL)
ADLS_ACUITY_SCORE: 41

## 2024-12-08 ASSESSMENT — COLUMBIA-SUICIDE SEVERITY RATING SCALE - C-SSRS: IS THE PATIENT NOT ABLE TO COMPLETE C-SSRS: UNABLE TO VERBALIZE

## 2024-12-08 NOTE — ED NOTES
"LM attempted DEC assessment at 1:45PM. Pt was minimally responsive, holding his hands up in a rigid position and displaying what appeared to be a combination of a laugh and a cry in response to questions. When asked why he was in the hospital he replied \"I don't remember\". Per request, nursing staff will reach out what pt becomes verbal and coherent.   "

## 2024-12-08 NOTE — PROGRESS NOTES
[facility] Note:    Writer attempted to engage Pt in initial mental health crisis assessment. Given Pt's presentation of not responding at times to verbal prompting, continuous laughing, and pacing around his room, the [facility] team will attempt to complete the assessment when Pt is able to appropriately participate.     Writer informed MD of Pt's presentation and inability to complete the initial mental health crisis assessment at this time given Pt's presentation.

## 2024-12-08 NOTE — ED NOTES
Bed: ED17  Expected date:   Expected time:   Means of arrival:   Comments:  Dave [address] 29 M found down outside non verbal unknown mental health issues htn tachy

## 2024-12-08 NOTE — ED NOTES
Bed: [facility]  Expected date: 12/8/24  Expected time:   Means of arrival:   Comments:  Room 17

## 2024-12-08 NOTE — ED NOTES
Pt laying on cart, clenching fists. Sliding soles of his feet back an forth on the cart. Dr. Rowe reassessed

## 2024-12-08 NOTE — ED NOTES
HP attempted DEC assessment, third attempt. Pt continues to respond minimally, gesturing and making a laughing/crying sound. Pt had soiled himself again.     [facility] called pt's emergency contact a 2nd time and the phone was answered. The number ([phone])  does not appear to belong to pt's mother, [place].

## 2024-12-08 NOTE — ED PROVIDER NOTES
Emergency Department Note      History of Present Illness     Chief Complaint   Psychiatric Evaluation      HPI   Delroy Ramirez is a 39 year old male with a history of methamphetamine use, alcohol use disorder, paranoid schizophrenia, antisocial personality disorder, housing insecurity  presenting for a phychiatric evaluation. As per the nurse in charge, the patient was found [place] [address]. History is limited as per patients condition.     Independent Historian   EMS and Nurse as detailed above.    Review of External Notes   I reviewed note from yesterday from [facility].     Past Medical History     Medical History and Problem List   methamphetamine use   alcohol use disorder   paranoid schizophrenia   antisocial personality disorder    Medications   buprenorphine-naloxone   clonidine  mirtazapine   naloxone  olanzapine   ondansetron   risperidone     Surgical History   Wrist surgery    Physical Exam     Patient Vitals for the past 24 hrs:   BP Temp Temp src Pulse Resp SpO2   12/08/24 1128 (!) 149/105 99  F (37.2  C) Oral 114 18 99 %     Physical Exam  GENERAL: Unkept, wet, with 2 layers of sweat pants.   HEAD: atraumatic  EYES: pupils reactive, extraocular muscles intact, conjunctivae normal  ENT:  mucus membranes moist  NECK:  trachea midline, normal range of motion  RESPIRATORY: no tachypnea, breath sounds clear to auscultation   CVS: normal S1/S2, no murmurs, intact distal pulses  ABDOMEN: soft, nontender, nondistention  MUSCULOSKELETAL: no deformities  SKIN: warm and dry, no acute rashes or ulceration  NEURO: GCS 15, cranial nerves intact, alert and oriented x1  PSYCH:  Crying/laughing, appear disorganized     Diagnostics     Lab Results   Labs Ordered and Resulted from Time of ED Arrival to Time of ED Departure - No data to display    Imaging   No orders to display           Independent Interpretation   None    ED Course      Medications Administered   Medications   OLANZapine zydis (zyPREXA)  ODT tab 10 mg (has no administration in time range)   OLANZapine zydis (zyPREXA) ODT tab 10 mg (10 mg Oral $Given 12/8/24 1103)   OLANZapine zydis (zyPREXA) ODT tab 10 mg (10 mg Oral $Given 12/8/24 1638)       Procedures   Procedures     Discussion of Management   None    ED Course   ED Course as of 12/08/24 1645   Sun Dec 08, 2024   1051 I obtained the history and examined the patient as noted above.     1112 Mental health professional attempted to talk to the patient; he was not able to get anything as patient kept laughing.        Additional Documentation  None    Medical Decision Making / Diagnosis     CMS Diagnoses: None    MIPS       None    MDM   Delroy Ramirez is a 39 year old male presents with history of polysubstance use predominantly methamphetamine as well as underlying paranoid schizophrenia on antisocial personality disorder.  Reviewed his records ICU was at [facility] yesterday was altered and cleared after receiving droperidol per EMS.  He was found by a local [place] and looked to be disorganized.  Here he is able to attempt to get his close on but not able to converse.  There is no signs of head trauma.  Patient was given Zyprexa with attempts to help him process his presumed and ingestion and will await for improvement of his mentation and stability to be reassessed.    Disposition   Signed out to my partner awaiting sobriety and improved mental status    Diagnosis     ICD-10-CM    1. Polysubstance abuse (H)  F19.10       2. Altered mental status, unspecified altered mental status type  R41.82            Discharge Medications   New Prescriptions    No medications on file         Scribe Disclosure:  I, Brandy Tapia, am serving as a scribe at 11:02 AM on 12/8/2024 to document services personally performed by Connor Rowe MD based on my observations and the provider's statements to me.        Connor oRwe MD  12/08/24 7516

## 2024-12-08 NOTE — ED TRIAGE NOTES
Pt found wandering inappropriately dressed for weather, recently discharged from [facility]. Pt awake not engaging in conversation, moving constantly. Seems cooperative thus far.

## 2024-12-09 ENCOUNTER — APPOINTMENT (OUTPATIENT)
Dept: CT IMAGING | Facility: CLINIC | Age: 39
End: 2024-12-09
Attending: EMERGENCY MEDICINE
Payer: COMMERCIAL

## 2024-12-09 ENCOUNTER — TELEPHONE (OUTPATIENT)
Dept: BEHAVIORAL HEALTH | Facility: CLINIC | Age: 39
End: 2024-12-09
Payer: COMMERCIAL

## 2024-12-09 PROBLEM — F19.959 SUBSTANCE-INDUCED PSYCHOTIC DISORDER (H): Status: ACTIVE | Noted: 2024-12-09

## 2024-12-09 PROCEDURE — 70450 CT HEAD/BRAIN W/O DYE: CPT

## 2024-12-09 PROCEDURE — 99244 OFF/OP CNSLTJ NEW/EST MOD 40: CPT | Performed by: PHYSICIAN ASSISTANT

## 2024-12-09 PROCEDURE — 250N000013 HC RX MED GY IP 250 OP 250 PS 637: Performed by: PHYSICIAN ASSISTANT

## 2024-12-09 RX ORDER — OLANZAPINE 5 MG/1
5 TABLET, ORALLY DISINTEGRATING ORAL 2 TIMES DAILY
Status: DISCONTINUED | OUTPATIENT
Start: 2024-12-09 | End: 2024-12-10 | Stop reason: HOSPADM

## 2024-12-09 RX ORDER — ACETAMINOPHEN 325 MG/1
325-650 TABLET ORAL EVERY 6 HOURS PRN
COMMUNITY

## 2024-12-09 RX ADMIN — OLANZAPINE 5 MG: 5 TABLET, ORALLY DISINTEGRATING ORAL at 10:10

## 2024-12-09 RX ADMIN — OLANZAPINE 5 MG: 5 TABLET, ORALLY DISINTEGRATING ORAL at 21:50

## 2024-12-09 ASSESSMENT — ACTIVITIES OF DAILY LIVING (ADL)
ADLS_ACUITY_SCORE: 41

## 2024-12-09 NOTE — ED NOTES
Pt noted laying down on bed, laughing loudly. Writer walked into the room and asked pt if he was ok. Pt states he is ok,

## 2024-12-09 NOTE — CONSULTS
"      Initial Psychiatric Consult   Consult date: December 9, 2024         Reason for Consult, requesting source:      Requesting source: Madi Harp    Labs and imaging reviewed. Patient seen and evaluated by Pieter Rogers PA-C          HPI:   38 yo male found down in community.   Recent presentation to [facility] on 12/7/24. Was found behaving oddly, and had white powder on person. Tachycardic at 140. Had been discharged from [facility] approximately 5 hours before that presentation.     Long hx of housing insecurity, polysubstance use, and antisocial personality disorder.   Chart review reflects that when intoxicated he often does not respond to questions and laughs absently to self. Often declines to provide urine sample, and will     Today patient is largely unable to participate in assessment. Poor eye contact. States that he \"forgot\" in answer to most questions. Unable to say where he is staying, or if he is supposed to take medications. Is able to endorse methamphetamine use. Trails of and giggles softly to himself. Makes odd motions with his hands. Slouching uncomfortably in hospital bed, but does not appear to notice. Has been intermitently able to request fluids and food from staff.         Past Psychiatric History:           Substance Use and History:           Past Medical History:   PAST MEDICAL HISTORY: No past medical history on file.    PAST SURGICAL HISTORY: No past surgical history on file.          Family History:   FAMILY HISTORY: No family history on file.    Family Psychiatric History:         Social History:   SOCIAL HISTORY:   Social History     Tobacco Use    Smoking status: Every Day     Types: Cigarettes    Smokeless tobacco: Never    Tobacco comments:     \"Two cigarettes a day\"   Substance Use Topics    Alcohol use: Never                Physical ROS:   The 10 point Review of Systems is negative other than noted in the HPI or here.           Medications:     Current Facility-Administered " Medications   Medication Dose Route Frequency Provider Last Rate Last Admin    OLANZapine zydis (zyPREXA) ODT tab 5 mg  5 mg Oral BID Pieter Rogers PA-C   5 mg at 12/09/24 1010              Allergies:   No Known Allergies       Labs:   No results found for this or any previous visit (from the past 48 hours).       Physical and Psychiatric Examination:     BP (!) 131/98   Pulse 92   Temp 97.5  F (36.4  C) (Oral)   Resp 18   SpO2 98%   Weight is 0 lbs 0 oz  There is no height or weight on file to calculate BMI.    Physical Exam:  I have reviewed the physical exam as documented by by the medical team and agree with findings and assessment and have no additional findings to add at this time.    Mental Status Exam:    Appearance: awake, alert  Attitude:  cooperative  Eye Contact:  poor   Mood:  anxious  Affect:  intensity is blunted  Speech:  paucity of speech  Language: Fluent in english   Psychomotor Behavior:  fidgeting and physical retardation  Thought Process:  goal oriented  Associations:  no loose associations  Thought Content:  no evidence of suicidal ideation or homicidal ideation  Insight:  limited  Judgement:  limited  Oriented to:   self  Attention Span and Concentration:  limited  Recent and Remote Memory:  limited  Fund of Knowledge: Appropriate   Gait and Station:                DSM-5 Diagnosis:   Substance induced psychosis.           Assessment:   Patient is likely decompensated secondary to relapse on methamphetamine. Was discharged from  treatment on 12/7 and has most likely been on the street since then. He endorses methamphetamine use in the last few days, but is largely too disorganized to offer much participation in assessment. He is not currently able to care for himself, and requires admission to allow time for his sensorium to clear.           Summary of Recommendations:   Admit to inpatient psychiatry, 72 hour hold  Schedule zydis 5mg BID      Pieter Rogers PA-C

## 2024-12-09 NOTE — PROGRESS NOTES
"  Triage & Transition Services, Extended Care     Therapy Progress Note    Patient: Delroy goes by \"Delroy,\" uses he/him pronouns  Date of Service: December 9, 2024  Site of Service: [facility] EMERGENCY DEPT                             BH1  Patient was seen yes  Mode of Assessment: In person    Presentation Summary: Writer met with Pt in his room, Pt was eating breakfast during assessment. Pt had intermittent eye contact with Pt and would answerer yes or no questions but there is no evidence of understanding from Pt. Pt appears to be responding to internal stimuli and at time inappropriately laughing. Pt is only oriented to self. When writer asked orientation questions Pt endorsed \"I forgot.\" Pt was not able to answer safety questions, Pt would just stare at writer and kick his feet back and forth. Pt has not displayed aggressive behavior and has been somewhat receptive to ED/ nursing interventions.    Therapeutic Intervention(s) Provided:  (unable to fully engage in assessment)    Current Symptoms:  (CHLOE)  (CHLOE)   inattentive, distractability  (CHLOE)    Mental Status Exam   Affect: Blunted  Appearance: Disheveled  Attention Span/Concentration: Inattentive  Eye Contact: Variable    Fund of Knowledge:  (CHLOE)   Language /Speech Content: Non-Fluent  Language /Speech Volume: Normal  Language /Speech Rate/Productions: Other (please comment) (CHLOE)  Recent Memory: Other (please comment) (CHLOE)  Remote Memory: Other (please comment) (CHLOE)  Mood: Other (please comment) (CHLOE)  Orientation to Person: Yes   Orientation to Place: No  Orientation to Time of Day: No  Orientation to Date: No     Situation (Do they understand why they are here?): No  Psychomotor Behavior: Hyperactive  Thought Content: Other (please comment) (CHLOE)  Thought Form: Other (please comment), Loose Associations (CHLOE)    Treatment Objective(s) Addressed: rapport building, identifying treatment goals (Pt did not show evidence of " understanding plan of care)    Patient Response to Interventions: no evidence of understanding    Progress Towards Goals: Patient Reports Symptoms Are: ongoing  Patient Progress Toward Goals: is not making progress  Comment: Pt has been minimally receptive to ED interventions  Next Step to Work Toward Discharge: symptom stabilization  Symptom Stabilization Comment: Pt continues to present with psychosis sx, possibly responding to internal stimuli.    Case Management: Summary of Interaction: None at time of assessment    Plan: inpatient mental health  yes provider, RN Not ordered or collected.  no    Clinical Substantiation: At this time IP MH admission is being recommended due to ongoing psychosis sx and disorganized behavior.  Pts current sx appear to be impacting his ability to safety and appropriately function in the community. Pt was not able to fully safety plan with writer. Pt does appear to be at higher risk of death by suicide accidental or intentional due to mental health hx and substance use hx. If Pt is able to effectively safety plan and/or Pts sx improve it would be beneficial to pursue a less restrictive alternative. Pt was placed on a 72 hour hold on 12/08/24 at 2348.    Legal Status: Legal Status at Admission: 72 Hour Hold  72 Hour Hold - Date/Time Initiated: 12/8/24 2349  72 Hour Hold - Date/Time Ends: 12/12/24 0000    Session Status: Time session started: 0910  Time session ended: 0915  Session Duration (minutes): 5 minutes  Session Number: 1  Anticipated number of sessions or this episode of care: 5    Time Spent: 5 minutes    CPT Code: CPT Codes: Non-Billable    Diagnosis:   Patient Active Problem List   Diagnosis Code    Psychosis (H) F29    Substance-induced psychotic disorder with hallucinations (H) F19.951    Methamphetamine use disorder, severe (H) F15.20    Depression, unspecified F32.A    Paranoid schizophrenia (H) F20.0    Substance-induced psychotic disorder (H) F19.959       Primary  Problem This Admission: Active Hospital Problems    Substance-induced psychotic disorder (H)      Psychosis (H)        Carlota Zepeda, [facility]   Licensed Mental Health Professional (LMHP), [facility]  [phone]

## 2024-12-09 NOTE — ED NOTES
Patient is unable to track any information, he is disorganized, unable to get into a conversation, he walks away,  Affect is flat,  insight is poor, patient was told several times to change his pants because they are soiled  with BM.  He requested to go home but he never answered my questions about getting changed.  He walked away.

## 2024-12-09 NOTE — ED NOTES
Correct name and number for pt's mother appears to be Enriqueta Medina [phone]. LM called for collateral and the phone was answered and immediately hung up on after introduction. Went straight to  on second call, vl left requesting callback to compete assessment.

## 2024-12-09 NOTE — ED NOTES
IP MH Referral Acuity Rating Score (RARS)    LMHP complete at referral to IP MH, with DEC; and, daily while awaiting IP MH placement. Call score to PPS.  CRITERIA SCORING   New 72 HH and Involuntary for IP MH (not adolescent) 1/1   Boarding over 24 hours 1/1   Vulnerable adult at least 55+ with multiple co morbidities; or, Patient age 11 or under 0/1   Suicide ideation without relief of precipitating factors 0/1   Current plan for suicide 0/1   Current plan for homicide 0/1   Imminent risk or actual attempt to seriously harm another without relief of factors precipitating the attempt 0/1   Severe dysfunction in daily living (ex: complete neglect for self care, extreme disruption in vegetative function, extreme deterioration in social interactions) 1/1   Recent (last 2 weeks) or current physical aggression in the ED 0/1   Restraints or seclusion episode in ED 0/1   Verbal aggression, agitation, yelling, etc., while in the ED 0/1   Active psychosis with psychomotor agitation or catatonia 1/1   Need for constant or near constant redirection (from leaving, from others, etc).  0/1   Intrusive or disruptive behaviors 0/1   TOTAL Acuity Total Score: 4

## 2024-12-09 NOTE — TELEPHONE ENCOUNTER
R:  [place]  Access Inpatient Bed Call Log 12/9/2024 8:12:02 AM  Intake has called facilities that have not updated the bed status within the last 12 hours.                  [facility] is posting 0 beds.             [facility] is posting 0 beds. [phone]:   [facility] is posting 0 beds. Negative covid required.   [facility] is posting 0 beds. Neg covid. No high school/[employer]-psych. [phone] 8:21AM PER MARLA, AT CAPAC[facility]. CB AFTER 11:00AM, NOTHING IN HIGH ACUITY.  [facility] is posting 0 beds. [phone]   [facility] is posting 0 bed. [phone]    [facility] is posting 5 beds. (Ages 18-35) Negative covid. [phone] 8:16AM PER ALLY, 1 CHILD, 2 YA, AND HANDFUL OF ADOL BEDS. NO SINA BEDS.  [facility] is posting 0 beds.    [facility] is posting 0 beds [phone]       [facility] is posting 4 beds. LOW acuity ONLY. Mixed unit 12+. Negative covid- [phone]   [facility] has 2 beds posted. No aggression. Negative Covid. Low acuity.   [facility] is posting 0 beds. Low acuity only. Neg covid.  [phone]  8:18AM PER PAT, AT CAPAC[facility].  [facility] is posting 2 beds. Low acuity. No current aggression.     [facility] is posting 0 beds. Negative covid. [phone].    [facility] ([place]) is posting 0 beds available. Negative covid.  [phone]. 8:18AM PER PAT, AT CAPAC[facility].      [facility] is posting 2 beds. Low acuity. 72 HH hold preferred. Negative covid required. [phone]. 8:20AM CB AFTER 9:30 AM.  [facility] ([facility] is posting 1 beds. Low acuity only. Neg covid.  [phone]. 8:18AM PER PAT, AT CAPAC[facility].  [facility] [place] is posting 4 beds.  Negative covid required.   Vol only, No history of aggression, violence, or assault. No sexual offenders. No 72  holds. [phone]        [facility] is posting 1 beds. Negative  covid required.  (Must have the cognitive ability to do programming. No aggressive or violent behavior or recent HX in the last 2 yrs. MH must be primary.) Always low acuity. [phone]    [facility] has 0 beds posted. Negative covid required.  Low acuity only. Violence and aggression capped.  [phone]  [facility] is posting 2 beds. Low acuity, Negative covid required. [phone] 8:17AM PER PADMINI, NO BEDS AVAILABLE.  [facility] posting 2 beds Negative covid required.  [phone].   [facility] [place] is posting 0 beds. Negative covid. LOW acuity. (No lines, drains, or tubes, oxygen, CPAP, IV, etc.) Must Have a Ride Home. [phone]  [facility] is posting 2 beds. Negative covid. (No. lines, drains, or tubes, oxygen, CPAP, IV, etc.) [phone]  8:14AM PER ILYA, BEDS AVAILABLE INCLUDING HIGH ACUITY.   is posting 2 beds. No covid test required. OUT OF STATE. [phone] 8:12AM NO ADULTS, A FEW CHILD AND ADOL BEDS.      Pt remains on the work list pending appropriate bed availability.

## 2024-12-09 NOTE — ED NOTES
IP MH Referral Acuity Rating Score (RARS)    LMHP complete at referral to IP MH, with DEC; and, daily while awaiting IP MH placement. Call score to PPS.  CRITERIA SCORING   New 72 HH and Involuntary for IP MH (not adolescent) 1/1   Boarding over 24 hours 0/1   Vulnerable adult at least 55+ with multiple co morbidities; or, Patient age 11 or under 0/1   Suicide ideation without relief of precipitating factors 0/1   Current plan for suicide 0/1   Current plan for homicide 0/1   Imminent risk or actual attempt to seriously harm another without relief of factors precipitating the attempt 0/1   Severe dysfunction in daily living (ex: complete neglect for self care, extreme disruption in vegetative function, extreme deterioration in social interactions) 1/1   Recent (last 2 weeks) or current physical aggression in the ED 0/1   Restraints or seclusion episode in ED 0/1   Verbal aggression, agitation, yelling, etc., while in the ED 0/1   Active psychosis with psychomotor agitation or catatonia 0/1   Need for constant or near constant redirection (from leaving, from others, etc).  0/1   Intrusive or disruptive behaviors 0/1   TOTAL Acuity Total Score: 2
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
## 2024-12-09 NOTE — TELEPHONE ENCOUNTER
"R: 5:03 PM Pt declined by provider Brenda  for unit 32 due to urinating and defecating on himself and being unable to follow up guidance to clean self.  Provider Brenda reports this is exclusionary criteria and it would not be appropriate for pt to admit to a [facility] unit.    Provider Brenda messaged me stating pt can be re-presented in the morning.  Supervisor Bee in agreement, pt will remain on list for a follow up in the morning to see if pt is improving.  Pt highlighted orange for \"needing further review\".  Email sent to leadership regarding exclusionary criteria and to follow up in the morning.      R:  [place]  Access Inpatient Bed Call Log 12/9/2024 @6:00PM:  Intake has called facilities that have not updated the bed status within the last 12 hours.                  [facility] is posting 0 beds.             [facility] is posting 0 beds. [phone]:   [facility] is posting 0 beds. Negative covid required.   [facility] is posting 0 beds. Neg covid. No high school/[employer]-psych. [phone] 8:21AM PER MARLA, AT [facility]. CB AFTER 11:00AM, NOTHING IN HIGH ACUITY.  [facility] is posting 0 beds. [phone]   [facility] is posting 0 bed. [phone]    [facility] is posting 7 Young Adult beds. (Ages 18-35) Negative covid. [phone] 8:16AM PER ALLY, 1 CHILD, 2 YA, AND HANDFUL OF ADOL BEDS. NO SINA BEDS.  [facility] is posting 0 beds.    [facility] is posting 0 beds [phone]       [facility] is posting 6 beds. LOW acuity ONLY. Mixed unit 12+. Negative covid- [phone]   [facility] has 2 beds posted. No aggression. Negative Covid. Low acuity.   [facility] is posting 4 beds. Low acuity only. Neg covid.  [phone]  8:18AM PER PAT, AT [facility].  [facility] is posting 2 beds. Low acuity. No current aggression.     [facility] is posting 0 beds. Negative covid. [phone].    [facility] ([place] is posting " 1 beds available. Negative covid.  [phone]. 8:18AM PER PAT, AT [facility].      [facility] is posting 2 beds. Low acuity. 72 HH hold preferred. Negative covid required. [phone]. 8:20AM CB AFTER 9:30 AM.  [facility] is posting 1 beds. Low acuity only. Neg covid.  [phone]. 8:18AM PER PAT, AT [facility].  [facility] [place] is posting 4 beds.  Negative covid required.   Vol only, No history of aggression, violence, or assault. No sexual offenders. No 72 HH holds. [phone]        [facility] is posting 1 beds. Negative covid required.  (Must have the cognitive ability to do programming. No aggressive or violent behavior or recent HX in the last 2 yrs. MH must be primary.) Always low acuity. [phone]    [facility] has 0 beds posted. Negative covid required.  Low acuity only. Violence and aggression capped.  [phone]  [facility] is posting 2 beds. Low acuity, Negative covid required. [phone] 8:17AM PER PADMINI, NO BEDS AVAILABLE.  [facility] posting 2 beds Negative covid required.  [phone].   [facility] [place] is posting 0 beds. Negative covid. LOW acuity. (No lines, drains, or tubes, oxygen, CPAP, IV, etc.) Must Have a Ride Home. [phone]  [facility] is posting 5 beds. Negative covid. (No. lines, drains, or tubes, oxygen, CPAP, IV, etc.) [phone]  8:14AM PER ILYA, BEDS AVAILABLE INCLUDING HIGH ACUITY.  [facility] is posting 6 beds. No covid test required. OUT OF STATE. [phone] 8:12AM NO ADULTS, A FEW CHILD AND ADOL BEDS.      Pt remains on the work list pending appropriate bed availability.

## 2024-12-09 NOTE — ED PROVIDER NOTES
Patient signed out to me by Dr. Rowe pending DEC assessment.    Briefly, patient has history of polysubstance use and presents to the ER today wondering and appropriately dressed for weather and not engaging in conversation.  DEC  attempted to evaluate the patient multiple times and patient unwilling to participate, but seemingly still responding to internal stimuli.  Patient was observed in ER for an extended period of time hoping for metabolization of underlying potential substance would lead to clearance of psychosis, but patient's behavior persisted.  CT head ordered negative for acute findings.  Based on multiple chart reviews from prior ED visit, patient has history of antisocial personality disorder and paranoid schizophrenia.  Nonetheless, DEC  is concerned regarding patient's ability to care for self and they are unable to find family members to provide collateral information.  Mother allegedly immediately hung up phone after LMHP introduction.  DEC recommends inpatient psychiatric admission.    Disposition:  Patient signed out to my colleague Dr. Germain pending inpatient mental health bed placement.     Warren Barry,   12/09/24 0119

## 2024-12-09 NOTE — ED NOTES
Patient up to restroom, patient given urinal to use to collect a sample. Patient did not urinate into urinal.     Urinal left at bedside and patient informed to use the urinal next time he has the urge to pee.

## 2024-12-09 NOTE — ED NOTES
Pt woke up, ambulated to the bathroom and back to bed, steady gait. Pt took sips of water and laid down.

## 2024-12-10 ENCOUNTER — TELEPHONE (OUTPATIENT)
Dept: BEHAVIORAL HEALTH | Facility: CLINIC | Age: 39
End: 2024-12-10
Payer: COMMERCIAL

## 2024-12-10 VITALS
TEMPERATURE: 98.1 F | DIASTOLIC BLOOD PRESSURE: 81 MMHG | SYSTOLIC BLOOD PRESSURE: 137 MMHG | RESPIRATION RATE: 18 BRPM | HEART RATE: 85 BPM | OXYGEN SATURATION: 98 %

## 2024-12-10 PROCEDURE — 99284 EMERGENCY DEPT VISIT MOD MDM: CPT | Performed by: PHYSICIAN ASSISTANT

## 2024-12-10 PROCEDURE — 250N000013 HC RX MED GY IP 250 OP 250 PS 637: Performed by: PHYSICIAN ASSISTANT

## 2024-12-10 RX ORDER — OLANZAPINE 10 MG/1
10 TABLET, ORALLY DISINTEGRATING ORAL AT BEDTIME
Qty: 14 TABLET | Refills: 0 | Status: SHIPPED | OUTPATIENT
Start: 2024-12-10 | End: 2024-12-10

## 2024-12-10 RX ORDER — OLANZAPINE 10 MG/1
10 TABLET ORAL AT BEDTIME
Qty: 14 TABLET | Refills: 0 | Status: SHIPPED | OUTPATIENT
Start: 2024-12-10 | End: 2024-12-24

## 2024-12-10 RX ADMIN — OLANZAPINE 5 MG: 5 TABLET, ORALLY DISINTEGRATING ORAL at 08:47

## 2024-12-10 ASSESSMENT — COLUMBIA-SUICIDE SEVERITY RATING SCALE - C-SSRS
SUICIDE, SINCE LAST CONTACT: NO
REASONS FOR IDEATION SINCE LAST CONTACT: EQUALLY TO GET ATTENTION, REVENGE, OR A REACTION FROM OTHERS AND TO END/STOP THE PAIN
TOTAL  NUMBER OF ABORTED OR SELF INTERRUPTED ATTEMPTS SINCE LAST CONTACT: NO
TOTAL  NUMBER OF INTERRUPTED ATTEMPTS SINCE LAST CONTACT: NO
2. HAVE YOU ACTUALLY HAD ANY THOUGHTS OF KILLING YOURSELF?: PASSIVE SI
1. SINCE LAST CONTACT, HAVE YOU WISHED YOU WERE DEAD OR WISHED YOU COULD GO TO SLEEP AND NOT WAKE UP?: YES
5. HAVE YOU STARTED TO WORK OUT OR WORKED OUT THE DETAILS OF HOW TO KILL YOURSELF? DO YOU INTEND TO CARRY OUT THIS PLAN?: NO
1. SINCE LAST CONTACT, HAVE YOU WISHED YOU WERE DEAD OR WISHED YOU COULD GO TO SLEEP AND NOT WAKE UP?: PASSIVE SI
2. HAVE YOU ACTUALLY HAD ANY THOUGHTS OF KILLING YOURSELF?: YES
6. HAVE YOU EVER DONE ANYTHING, STARTED TO DO ANYTHING, OR PREPARED TO DO ANYTHING TO END YOUR LIFE?: NO
ATTEMPT SINCE LAST CONTACT: NO

## 2024-12-10 ASSESSMENT — ACTIVITIES OF DAILY LIVING (ADL)
ADLS_ACUITY_SCORE: 41

## 2024-12-10 NOTE — TELEPHONE ENCOUNTER
R:    Duane Carlota RAMIREZ 10:28 AM    MRN: [id number] is discharging and can be removed from the IP work list     Intake notes pt removed from active placement [facility]. Intake will no longer follow for IP MH admission.

## 2024-12-10 NOTE — ED NOTES
Patient provided with toothbrush and toothpaste. Patient brushed teeth in bathroom. Patient given apple juice and is now lying supine on gurney watching TV.

## 2024-12-10 NOTE — PROGRESS NOTES
"  Triage and Transition Services Extended Care Reassessment     Patient: Delroy goes by \"Edlroy,\" uses he/him pronouns  Date of Service: December 10, 2024  Site of Service: [facility] EMERGENCY DEPT                             [facility]  Patient was seen yes  Mode of Assessment: In person     Reason for Reassessment: other (see comment) decrease in sx, Pt and provider request.     History of Patient's Original Emergency Room Encounter: Patient presents to the ED tonight after being found outside non verbal. Patient was unable to engage in appropriately answering questions throughout assessment. Patient was observed to be laughing inappropriately, possibly responding to stimuli, hyperactive, and minimally responsive. Throughout assessment, patient would sit up on the bed frequently and take sips of water, though would sometimes sit up on the edge of the bed and immediately lay back down. At one point, patient stood up and walked around his room and Patient stated, \"I want to go.\" Per chart review, patient has a history of similar presentations when using methamphetamine. Patient was unable to communicate whether he recently used substances or not. Per chart review, patient also has a history of incontinence, inappropriate sexual behaviors, inability to communicate, and violence towards others when under the influence of methamphetamine. Patient was unable to engage in answering questions related to suicide assessment, audtory/visual hallucinations, homicide ideation, and self-harm.    Presentation Summary: Pt presented with a somewhat anxious affect. Pts mood was congruent with this presentation. Pt was cooperative during assessment, Pt does appear more lucid and was following and answering assessment questions appropriately. In the beginning of the assessment Pt was putting his hands down his pants but was redirectable.  Pt endorsed passive SI during assessment Pt did not endorse plan or intent. Pt did " "not endorse any HI, but stated \"if breakfast isn't good I might hurt staff.\" Pt then endorsed he would not hurt anyone after redirection from writer. Pt endorsed wanting to go back to his  treatment facility and writer informed Pt that he was discharged from [facility] this past week.  Pt endorsed understanding of this and was provided shelter/ crisis resources. Pt was also provided several community walk in/ sliding fee resources in the community. Pt endorsed [place] and Pt requested bus tokens.    Changes Observed Since Initial Assessment: patient/family request, decrease in presenting symptoms    Therapeutic Interventions Provided: Engaged in safety planning, Coached on coping techniques/relaxation skills to help improve distress tolerance and managing intense emotions., Reviewed healthy living that supports positive mental health, including looking at sleep hygiene, regular movement, nutrition, and regular socialization., Identified and practiced coping skills., Worked on relapse prevention planning (review of stressors, early warning signs, written plan to respond to signs, and rehearse plan).    Current Symptoms: anxious  forgetful, distractability increased appetite    Mental Status Exam   Affect: Blunted  Appearance: Disheveled  Attention Span/Concentration: Attentive  Eye Contact: Variable    Fund of Knowledge: Appropriate   Language /Speech Content: Fluent  Language /Speech Volume: Normal  Language /Speech Rate/Productions: Minimally Responsive  Recent Memory: Poor  Remote Memory: Variable  Mood: Anxious  Orientation to Person: Yes   Orientation to Place: Yes  Orientation to Time of Day: No  Orientation to Date: No     Situation (Do they understand why they are here?): Yes  Psychomotor Behavior: Normal  Thought Content: Clear  Thought Form: Goal Directed    Treatment Objective(s) Addressed: rapport building, identifying treatment goals, identifying additional " supports, assessing safety, safety planning    Patient Response to Interventions: acceptance expressed, verbalizes understanding    Progress Towards Goals:  Patient Reports Symptoms Are: improving  Patient Progress Toward Goals: is not making progress  Comment: Pt has been receptive to ED interventions  Next Step to Work Toward Discharge: symptom stabilization  Symptom Stabilization Comment: Pt appears to be more clear and closer to his baseline, Pt endorsed passive SI    Case Management: Summary of Interaction: None at time of assessment    C-SSRS Since Last Contact:   1. Wish to be Dead (Since Last Contact): Yes  Wish to be Dead Description (Since Last Contact): passive SI  2. Non-Specific Active Suicidal Thoughts (Since Last Contact): Yes  Non-Specific Active Suicidal Thought Description (Since Last Contact): Passive SI  3. Active Suicidal Ideation with any Methods (Not Plan) Without Intent to Act (Since Last Contact): No  4. Active Suicidal Ideation with Some Intent to Act, Without Specific Plan (Since Last Contact): No  5. Active Suicidal Ideation with Specific Plan and Intent (Since Last Contact): No  Most Severe Ideation Rating (Since Last Contact): 1  Description of Most Severe Ideation (Since Last Contact): passive SI  Frequency (Since Last Contact): Less than once a week  Duration (Since Last Contact): Fleeting, few seconds or minutes  Controllability (Since Last Contact): Easily able to control thoughts  Deterrents (Since Last Contact): Deterrents probably stopped you  Reasons for Ideation (Since Last Contact): Equally to get attention, revenge, or a reaction from others and to end/stop the pain  Actual Attempt (Since Last Contact): No  Has subject engaged in non-suicidal self-injurious behavior? (Since Last Contact): No  Interrupted Attempts (Since Last Contact): No  Aborted or Self-Interrupted Attempt (Since Last Contact): No  Preparatory Acts or Behavior (Since Last Contact): No  Suicide (Since Last  Contact): No     Calculated C-SSRS Risk Score (Since Last Contact): Low Risk    Plan: Final Disposition / Recommended Care Path: discharge  Plan for Care reviewed with assigned Medical Provider: yes  Plan for Care Team Review: provider, RN  Patient and/or validated legal guardian concurs: yes  Clinical Substantiation: At this time IP MH admission is not being recommended due psychiatry recommendation and due to Pt not endorsing any active SI/SIB/HI or AH/VH. Pt did present with significant sx improvement today and Pt was able to safety plan with writer and endorsed willingness to continue with medication recommendations in the community. Pts current presentation appears to be chronic in nature and Pts current sx appear to be close to Pts baseline. Writer did offer IP MH admission/ observation and Pt declined this level of care voluntarily and per psychiatry Pt does not meet criteria for a 72 hour hold today and his hold was discontinued. Pt does appear to be at higher risk of death by suicide accidental or intentional due to mental health hx and substance use hx.  At this time IP MH admission does not appear to be the least restrictive level of care for Pt. Pt appears to be able to use and motivated to engage in supportive mental health/ community resources.    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

## 2024-12-10 NOTE — ED PROVIDER NOTES
I received sign out from Dr. Rivas.  Please refer to their H&P for further information.  In brief, patient awaiting mental health assessment and possible admission.     1023 AM spoke with DEC.  Psych saw patient and feels he is safe for discharge.  At baseline.  He wants tokens to go home.      1230 I spoke with psych.  He recommends discharge.      Patient agrees with discharge.       Gale Unger MD  12/10/24 7615

## 2024-12-10 NOTE — DISCHARGE INSTRUCTIONS
Mental health recommendations    Please follow-up with your outpatient team about your visit today.    2.  One of our care coordinators will reach out to you after your discharge.  If you have any questions about additional resources please call our coordinators at # [phone]. If you would like to schedule an appointment for therapy or psychiatry  please call our Behavioral Access Scheduling office at [phone].      3.  Please use aftercare plan and already established coping skills and safety planning as needed.     4.  Please call 911 and/or return to the emergency department if her symptoms worsen or your safety becomes compromised. [facility] Adult crisis line, [phone]      [address] [place] [address] Triage Open Monday through Friday     Medical and Behavioral Health Triage is a new service at [address] [place] [address] that brings together community-based mental health agencies, [facility] , and [facility] ([facility]) to address our clients' complex needs. The goal of this program is to reach people that are not already receiving services or that are not already connected to case management who have an urgent concern related to their mental health or substance use disorder.   Care coordinators, peer recovery specialists, and health professionals at [address] [place] [address] will address clients' physical health, mental health, addiction issues - and also the wrap-around services that they need to stay healthy, including housing, health insurance, and other resources.   Triage is in [address] at [address] [place] [address]. It is accessible by police from the parking lot. Everyone else can come through the front door of [address] [place] [address], and follow signs to [address].     Triage hours:9:000 am - 5:00 pm  Triage Phone Number: [phone]  Location: [address] [place] [address]  Contact the Mental Health Center  Phone: [phone]  Fax: [phone]  [facility] [place]  [address]    [facility]  [facility] is a [facility] facility. We provide ongoing, comprehensive, client-centered treatment. Our treatment promotes whole-person wellness and recovery, self-efficacy, and life enhancement.  Our experienced team of professionals understands that recovery is a personal process. We work with each client to develop a tailored plan of care. The plan builds on strengths and focuses on person-specific goals. We invite participation of the client's support system and other service providers.  We welcome those who may benefit from our flexible service model. We offer scheduled appointments as well as walk-in visits. We strive to be accessible and responsive to our clients' needs.    Appointments  Call the clinic's  or your therapist at [phone]. They will help determine if an in-person visit is needed and possible.  Hours  Monday, Thursday, Friday: 8 a.m. to 5 p.m.  Tuesday: 9:30 a.m. to 5 p.m.  Wednesday: 8 a.m. to 6 p.m.  Closed on holidays.       You may self refer for most Crisis Residence services. This is a short-term service, typically 3-10 days, for stabilization when experiencing a mental health crisis. They provide housing and treatment services that integrate mental health, medical, and substance use care.     Patricia Olmos [employer] [facility] [employer]  Main phone: [phone]   Direct: [phone]   [address] [place] [address]       [facility] [place] [facility] Stabilization [facility]   [phone]   [address] [facility] [address]      [facility] [employer].  Main phone: [phone]   Direct: [phone]   [address] [place] [address]      [facility]  [phone]   [address] [place] [address]      [facility] Crisis **requires referral from a medical facility  [phone] [address] [place] [address]      Wherever you attend for a crisis  residence, if possible bring any medications you may have in their prescribed bottles.        [facility] HOMELESSNESS RESOURCES       For shelter tonight, start with Adult Shelter Connect Overnight Shelter: [phone]  *Phone lines are closed between 5:30-7:30 pm    [place] (enter on the [address] side near the [address] corner)  Walk-in Hours: 9 am - 5:30 pm Monday-Friday and 1 pm - 5:30 pm Saturday and Sunday    [employer] [facility]  [phone] [address] [facility] s Shelter  [phone]  [address] [facility]    [facility]  [phone]  [address] [facility]  [phone]  [address] [facility]  [phone] [address] [place] [address]    To start making a plan for a more long-term solution, contact the Coordinated Entry Homeless Assistance Program:    Coordinated Entry Homeless Assistance  [facility]  [address] [place] [address]  [phone]  Open Wednesdays and Thursdays 8 am-2 pm  The Coordinated Entry System is the [facility]'s approach to organizing and providing housing services for people experiencing homelessness in [facility].  Because housing resources are limited, this process is designed to ensure that individuals and families with the highest vulnerability, service needs, and length of homelessness receive top priority in housing placement.    Assessment changes due to COVID-19 virus    [facility] Human Services family assessors will now be conducting assessments by phone. If you are working with a family staying in a place other than a [facility]-funded shelter and is eligible for Coordinated Entry, continue to contact [facility]  at [phone] to set up an assessment.    For single adults, [facility] Housing Services will be suspending drop-in hours at [facility]. To have a Coordinated Entry  assessment completed, call the [facility] Services' certified assessors: Mir at [phone] or Vilam at [phone] directly to set up a time to meet    Call 211 at any time on any day for questions about services and resources available to you.      Family Shelters    [facility] Shelter Team  [phone]  [address] [facility], Floors 5 & 6              Youth, families & disabled adults    Hours: Mon-Fri 8:00 am-4:30 pm.  After-Hours Shelter Team  211         Drop-Ins    [facility]  [phone]  [address] ([facility] [place])              Showers/Laundry (8-11am)              Health Care              Employment Services              Breakfast (7-8 am)              Lunch (11:30am-12:30pm)    Hours: Mon-Sat: Summer 7am-3pm; Winter 7am-4pm.         [facility] [phone]  [address] [place] [address]  Hours: Mon, Wed and Fri 9:00-11:30 am      Clothing    [facility]  [phone]  [address] [place] [address]  Hours: M-Th 10-11:30am & 1:30-3:30pm; Sat/Sun 9:30-10:30 am         Free Meals & Showers    [employer]  [phone]  [address] [place] [address]  Dinner: Mon-Fri 5:30-6:30pm (No showers)    [facility]  [phone]  Meals ([address]): Women & Children M-F 8:30-9am; Everyone: M-F 12-1pm; Sat/Sun, 10:30-11:30 am  Showers ([address] [place] [address]): Mon-Fri 9-10 am    [employer]  [phone]  [address]  Dinner: Thurs - Mon 6 pm  Showers: Daily 8 - 4:30 pm    Health Care    [facility] Care for the Homeless  [phone]  Clinics are in [address] [place] shelters/drop-in centers.  Hours: Mon-Fri at varying sites. Call for sites/times. No insurance or appts required to receive care.  Clinic sites: Adult [facility], [facility], [facility], [facility], [facility], [facility], [facility], [facility], [facility], [facility], [facility]      Domestic/Sexual Violence  Survivors    [facility]  [phone]  [address] [place] [address]            Singles women, families, survivors of prostitution & domestic abuse    Rape & Sexual Violence Hotline  981-760-QZGT    [facility] Toll-Free 24h crisis line  [phone]     [facility] Crisis [facility]  [phone]      Rule 25 Chemical Health Assessments    [facility]  [phone]  [address] [facility]  Hours: Mon-Fri, 8 am-2:30 pm (first come, first served)    [facility]  [phone] [address] [facility]  Walk-in: M-F, 7am-4pm (First come, first served or by appt)      Mental Health Care    [facility] ([facility])  Suicidal: [phone] Consultation: [phone]  [address] [facility], 24/7 Crisis Intervention Center     Walk-in Counseling Center  [phone]  Hours: Mon, Wed, Fri 1-3pm; Mon-Thurs 6:30-8:30pm      Veterans Services    [facility] Veterans Service Office  [phone]  Hours: Mon-Fri 8am-4:30 pm; 1st Floor [facility]    [facility] Community Resource & Referral  [phone]  [address]; Hours: Mon-Fri 7a-5p    [place] Assistance [place] for Vets (MAC[facility])  [phone]    [facility] Services for [facility] & Families (SSVF)  [phone] [address]

## 2024-12-10 NOTE — TELEPHONE ENCOUNTER
"7:25 AM MRN provided to Laura RN for review.    9:18 AM Paged Brenda.    9:30 AM Per messaged received form another  Coordinator Laura declined d/t Pt's high acuity. WL and admit board updated.    10:25 AM PT declined due to documented hx of and continuance of incontinence (urine and feces) by [place]. WL and admit board updated.      10:28 AM  Intake received message from EC\" is discharging and can be removed from the IP work list.\" Intake no longer following for placement.    R: [place]  Access Inpatient Bed Call Log 12/10/2024 @ 0815 AM:  Intake has called facilities that have not updated the bed status within the last 12 hours.         (ADULTS)  [place]:  [facility] is posting 0 beds.  [facility] is posting 0 beds. [phone]  [facility] ([employer]) is posting 0 beds.   [facility] is posting 0 beds. No high school or sravani psych. [phone] Call after morning meeting  [facility]) is posting 0 beds.  [facility]) is posting 0 beds. [phone]  [facility] is posting 7 beds. Ages 18-35, labs required. [phone] 8:32 AM Per Doug 7 YA beds, no sofia beds.   [facility] ([employer]) is posting 0 beds.  [facility] ([employer]) is posting 0 beds. [phone]    [place]:   [facility] () is posting 7 beds. Mixed unit (12+), low acuity. [phone]  [facility]) is posting 2 beds. No current aggression, low acuity.  [facility]) is posting 0 beds. [phone] 8:25 AM Per Eli, ~5 beds open.  [facility] ([employer]) is posting 0 beds. No current aggression, low acuity.   [facility] is posting 0 beds. [phone]. [phone] 8:21 AM Per VM @ CAP  [facility]) is posting 0 beds. [phone]  [facility] is posting 2 beds. Low acuity, 72HH preferred. [phone] 8:30 AM Per Eva, in staffing meeting, left VM for a call back.  [facility] ([facility]) is posting 1 beds. [phone] 8:25 " AM 8:25 AM 8:25 AM Per Eli, 1  bed open.  [facility] ([facility]) is posting 6 beds. VOL only, no hx of aggression/violence/assault. No sexual offenders, no 72HH. [phone]. Pt NOT APPROPRIATE D/T [place] 72 HH.  [facility] is posting 0 beds. Must have cognitive ability to program. No aggression or violent hx in the last 2 years. [phone]  [facility] is posting 0 beds. Low acuity, violence and aggression capped. [phone]  [facility] is posting 2 beds. [phone] 8:24 AM Per Lashae, currently on waitlist.   [facility] is posting 2 beds. [phone]  [facility] is posting 0 beds. No lines, drains, tubes, oxygen, IV or CPAP. [phone] 8:23 AM Per call, 2 open beds.  [facility] is posting 2 beds. MIXED UNIT. No lines, drains, tubes, oxygen, IV or CPAP. [phone]    [facility] is posting 6 beds. OUT OF STATE. [phone] 8:16 AM Per Tosha 10 adult beds. Pt NOT APPROPRIATE D/T [place] 72 HH.    Pt remains on work list pending appropriate bed availability.

## 2024-12-10 NOTE — ED NOTES
Pt was asked if he needed to change pants as there was a potent smell of stool. Pt said he needed to clean up, given bath wipes, depends and pants as he had soiled his pants. He cleaned himself up in the restroom as stool was cleaned off the bed by staff. When pt was asked why he doesn't get up to use the restroom, the patient laughed.

## 2024-12-10 NOTE — TELEPHONE ENCOUNTER
R: [place]  Access Inpatient Bed Call Log 12/9/2024 11:26PM   Intake has called facilities that have not updated the bed status within the last 12 hours.          (Adults)         [facility]:     [facility] is posting 0 beds.   [facility] is posting 0 beds. [phone]. -Per staff @ 11:36PM, they are full  [facility] is posting 0 beds. Negative covid required.    [facility] is posting 0 beds. Neg covid. No high school/[employer]-psych. [phone]. -Per [place] @ 11:37PM, 1 SD/low acuity bed   [facility] is posting 0 beds. [phone]    [facility] is posting 0 bed. [phone]     [facility] is posting 7 beds. (Ages 18-35) Negative covid. [phone]. Pt does not meet age requirement   [facility] is posting 0 beds.     [facility] ([facility] is posting 0 beds. [phone].   [facility] is posting 6 beds. LOW acuity ONLY. Mixed unit 12+. Negative covid- [phone]. Pt not appropriate d/t acuity    [facility] has 2 beds posted. No aggression. Negative Covid. Low acuity.  - Per [place] @ 12:13AM, they are currently at capacity   [facility] ([place] is posting 4 beds. Low acuity only. Neg covid.  [phone]. Meets exclusionary d/t assault charges  [facility] is posting 2 beds. Low acuity. No current aggression. - Per [place] @ 12:13AM, they are currently at capacity   [facility] is posting 0 beds. Negative covid. [phone].     [facility] ([place]) is posting 0 beds available. Negative covid.  [phone].        [facility] is posting 2 beds. Low acuity. 72 HH hold preferred. Negative covid required. [phone].  *Does not review after 10PM*  [facility] ([place]) is posting 1 bed. Low acuity only. Neg covid.  [phone]. Meets exclusionary d/t assault charges   [facility] [place] is posting 6 beds.  Negative covid required.  Vol only, No history of aggression, violence, or  assault. No sexual offenders. No 72 HH holds. [phone]. Meets exclusionary d/t 72 HH        [facility] is posting 0 beds. Negative covid required.  (Must have the cognitive ability to do programming. No aggressive or violent behavior or recent HX in the last 2 yrs. MH must be primary.) Always low acuity. [phone]      has 0 beds posted. Negative covid required.  Low acuity only. Violence and aggression capped.  [phone].   [facility] is posting 2 beds. Low acuity, Negative covid required. [phone]. -Per [place] @ 11:38PM, they are at capacity   [facility] [place] posting 2 beds. Negative covid required.  [phone].  -Per [place] @ 12:15AM, only SD/very low acuity beds and notes they have high unit acuity   [facility] [place] is posting 0 beds. Negative covid. LOW acuity. (No lines, drains, or tubes, oxygen, CPAP, IV, etc.) Must Have a Ride Home. [phone].   [facility] is posting 5 beds. Negative covid. (No. lines, drains, or tubes, oxygen, CPAP, IV, etc.) [phone]. Requires UDS and labs for review  [facility] is posting 6 beds. No covid test required. OUT OF STATE. [phone]. Per Intake policy, patients must be voluntary for placement in [place]. Pt is on a 72 HH     Pt remains on the work list pending appropriate bed availability.

## 2024-12-10 NOTE — TELEPHONE ENCOUNTER
R:  9:29 AM Per call from AMITA Arnold @ [facility], pt declined d/t acuity level.  following informed.

## 2024-12-10 NOTE — ED NOTES
Medication arrived from discharge pharmacy. Patient asking when he can leave.     Patient is ready for discharge. Patient's belongings brought to his room and patient allowed to change into his clothes. Discharge instructions, 2 bus tokens and his filled zyprexa prescription given to patient. Security walked with patient outside and directed him towards the bus stop.

## 2024-12-10 NOTE — ED NOTES
Spoke to the discharge pharmacy regarding the patient's prescribed discharge medications. The patient is on the restricted list for medicaid and medicaid requires a nurse to override the prescriber restriction.     Medicaid line was contacted and only allows for a voice message to be left. The message stated that they would return the call in the order it was received, if they do not get to it during business today it will be returned the following day.

## 2024-12-10 NOTE — PHARMACY-ADMISSION MEDICATION HISTORY
Pharmacist Admission Medication History    Admission medication history is complete. The information provided in this note is only as accurate as the sources available at the time of the update.    Information Source(s): Patient via in-person    Pertinent Information: Patient reports using no medications prior to this hospitalization.  EPIC includes one interaction with healthcare system in the past 12 months.  [place] [facility] [place] includes Acetaminophen and Naloxone.      Changes made to PTA medication list:  Added: Acetaminophen - Patient reports not taking  Deleted: All entries more than 12 months old.  No fill history for any prescription meds over that period.  Changed: None    Allergies reviewed with patient and updates made in EHR: no    Medication History Completed By: Fracisco Saravia RPH 12/9/2024 7:07 PM    No outpatient medications have been marked as taking for the 12/8/24 encounter (Hospital Encounter).

## 2024-12-10 NOTE — CONSULTS
"      Psychiatry Consultation; Follow up              Reason for Consult, requesting source:      Requesting source: Gale Unger    Labs and imaging reviewed,     Total time spent in chart review, patient interview and coordination of care;            Interim history:    Today patient is much less disorganized. He is able to respond to questions appropriately. He is requesting to return to  treatment, and plans to use the bus system to travel there. He requests additional food. Offers no explanation for not using the restroom yesterday. He is agreeable to continuing the zyprexa started here. Denies SI HI AVH. Odd laughter remains present, but is less obtrusive than yesterday. He is agreeable to taking a shower when prompted.         Current Medications:     Current Facility-Administered Medications   Medication Dose Route Frequency Provider Last Rate Last Admin    OLANZapine zydis (zyPREXA) ODT tab 5 mg  5 mg Oral BID Pieter Rogers PA-C   5 mg at 12/10/24 0847              MSE:   Appearance: awake, alert  Attitude:  cooperative  Eye Contact:  fair  Mood:  \"fair\"  Affect:  slightly restricted  Speech:  clear, coherent  Psychomotor Behavior:  no evidence of tardive dyskinesia, dystonia, or tics  Muscle strength and tone: intact   Thought Process:  goal oriented  Associations:  no loose associations  Thought Content:  no evidence of suicidal ideation or homicidal ideation  Insight:  limited  Judgement:  limited  Oriented to:  time, person, and place  Attention Span and Concentration:  limited  Recent and Remote Memory:  limited    Vital signs:  Temp: 98.1  F (36.7  C)   BP: 133/86 Pulse: 98   Resp: 18 SpO2: 98 % O2 Device: None (Room air)        Estimated body mass index is 26.96 kg/m  as calculated from the following:    Height as of 8/12/23: 1.88 m (6' 2\").    Weight as of 8/12/23: 95.3 kg (210 lb).    Qtc:          DSM-5 Diagnosis:   Substance induced psychosis          Assessment:   No longer requires " "admission.   Patient will benefit most from returning to CD treatment and working towards sobriety. He is rapidly approaching his baseline, and no longer appears to be an acute danger to himself. He appears reasonably able to navigate the community. He is agreeable to taking prescribed zyprexa            Summary of Recommendations:   Discharge from ED  Initiate zyprexa 10mg QHS    \"Much or all of the text in this note was generated through the use of Dragon Dictate voice to text software. Errors in spelling or words which appear to be out of contact are unintentional, may be present due having escaped editing\"           "

## 2024-12-11 ENCOUNTER — TELEPHONE (OUTPATIENT)
Dept: BEHAVIORAL HEALTH | Facility: CLINIC | Age: 39
End: 2024-12-11
Payer: COMMERCIAL

## 2024-12-11 NOTE — TELEPHONE ENCOUNTER
Unable to reach patient. Wrong number on file. No other phone number for patient was available in [facility] at time of call. No further follow-up is needed.